# Patient Record
Sex: MALE | Race: WHITE
[De-identification: names, ages, dates, MRNs, and addresses within clinical notes are randomized per-mention and may not be internally consistent; named-entity substitution may affect disease eponyms.]

---

## 2017-04-21 ENCOUNTER — RX RENEWAL (OUTPATIENT)
Age: 82
End: 2017-04-21

## 2017-05-05 ENCOUNTER — APPOINTMENT (OUTPATIENT)
Dept: OPHTHALMOLOGY | Facility: CLINIC | Age: 82
End: 2017-05-05

## 2017-06-05 ENCOUNTER — INPATIENT (INPATIENT)
Facility: HOSPITAL | Age: 82
LOS: 2 days | Discharge: HOME | DRG: 309 | End: 2017-06-08
Attending: INTERNAL MEDICINE | Admitting: INTERNAL MEDICINE
Payer: MEDICARE

## 2017-06-05 VITALS
DIASTOLIC BLOOD PRESSURE: 84 MMHG | OXYGEN SATURATION: 97 % | TEMPERATURE: 99 F | SYSTOLIC BLOOD PRESSURE: 122 MMHG | HEART RATE: 71 BPM | RESPIRATION RATE: 18 BRPM

## 2017-06-05 DIAGNOSIS — R06.02 SHORTNESS OF BREATH: ICD-10-CM

## 2017-06-05 DIAGNOSIS — I48.2 CHRONIC ATRIAL FIBRILLATION: ICD-10-CM

## 2017-06-05 DIAGNOSIS — I10 ESSENTIAL (PRIMARY) HYPERTENSION: ICD-10-CM

## 2017-06-05 DIAGNOSIS — N17.9 ACUTE KIDNEY FAILURE, UNSPECIFIED: ICD-10-CM

## 2017-06-05 DIAGNOSIS — I42.1 OBSTRUCTIVE HYPERTROPHIC CARDIOMYOPATHY: ICD-10-CM

## 2017-06-05 DIAGNOSIS — Z29.9 ENCOUNTER FOR PROPHYLACTIC MEASURES, UNSPECIFIED: ICD-10-CM

## 2017-06-05 DIAGNOSIS — E78.00 PURE HYPERCHOLESTEROLEMIA, UNSPECIFIED: ICD-10-CM

## 2017-06-05 LAB
ALBUMIN SERPL ELPH-MCNC: 4.4 G/DL — SIGNIFICANT CHANGE UP (ref 3.3–5)
ALP SERPL-CCNC: 56 U/L — SIGNIFICANT CHANGE UP (ref 40–120)
ALT FLD-CCNC: 20 U/L RC — SIGNIFICANT CHANGE UP (ref 10–45)
ANION GAP SERPL CALC-SCNC: 16 MMOL/L — SIGNIFICANT CHANGE UP (ref 5–17)
APTT BLD: 34 SEC — SIGNIFICANT CHANGE UP (ref 27.5–37.4)
AST SERPL-CCNC: 21 U/L — SIGNIFICANT CHANGE UP (ref 10–40)
BASE EXCESS BLDV CALC-SCNC: 5.3 MMOL/L — HIGH (ref -2–2)
BILIRUB SERPL-MCNC: 0.5 MG/DL — SIGNIFICANT CHANGE UP (ref 0.2–1.2)
BUN SERPL-MCNC: 65 MG/DL — HIGH (ref 7–23)
CA-I SERPL-SCNC: 1.21 MMOL/L — SIGNIFICANT CHANGE UP (ref 1.12–1.3)
CALCIUM SERPL-MCNC: 9.9 MG/DL — SIGNIFICANT CHANGE UP (ref 8.4–10.5)
CHLORIDE BLDV-SCNC: 103 MMOL/L — SIGNIFICANT CHANGE UP (ref 96–108)
CHLORIDE SERPL-SCNC: 101 MMOL/L — SIGNIFICANT CHANGE UP (ref 96–108)
CK MB CFR SERPL CALC: 2.8 NG/ML — SIGNIFICANT CHANGE UP (ref 0–6.7)
CK SERPL-CCNC: 113 U/L — SIGNIFICANT CHANGE UP (ref 30–200)
CO2 BLDV-SCNC: 34 MMOL/L — HIGH (ref 22–30)
CO2 SERPL-SCNC: 27 MMOL/L — SIGNIFICANT CHANGE UP (ref 22–31)
CREAT SERPL-MCNC: 3.6 MG/DL — HIGH (ref 0.5–1.3)
GAS PNL BLDV: 141 MMOL/L — SIGNIFICANT CHANGE UP (ref 136–145)
GAS PNL BLDV: SIGNIFICANT CHANGE UP
GAS PNL BLDV: SIGNIFICANT CHANGE UP
GLUCOSE BLDV-MCNC: 91 MG/DL — SIGNIFICANT CHANGE UP (ref 70–99)
GLUCOSE SERPL-MCNC: 92 MG/DL — SIGNIFICANT CHANGE UP (ref 70–99)
HCO3 BLDV-SCNC: 32 MMOL/L — HIGH (ref 21–29)
HCT VFR BLD CALC: 45.9 % — SIGNIFICANT CHANGE UP (ref 39–50)
HCT VFR BLDA CALC: 46 % — SIGNIFICANT CHANGE UP (ref 39–50)
HGB BLD CALC-MCNC: 14.9 G/DL — SIGNIFICANT CHANGE UP (ref 13–17)
HGB BLD-MCNC: 14.5 G/DL — SIGNIFICANT CHANGE UP (ref 13–17)
INR BLD: 1.51 RATIO — HIGH (ref 0.88–1.16)
LACTATE BLDV-MCNC: 2 MMOL/L — SIGNIFICANT CHANGE UP (ref 0.7–2)
MCHC RBC-ENTMCNC: 29.2 PG — SIGNIFICANT CHANGE UP (ref 27–34)
MCHC RBC-ENTMCNC: 31.6 GM/DL — LOW (ref 32–36)
MCV RBC AUTO: 92.2 FL — SIGNIFICANT CHANGE UP (ref 80–100)
NT-PROBNP SERPL-SCNC: 3681 PG/ML — HIGH (ref 0–300)
OTHER CELLS CSF MANUAL: 9 ML/DL — LOW (ref 18–22)
PCO2 BLDV: 57 MMHG — HIGH (ref 35–50)
PH BLDV: 7.36 — SIGNIFICANT CHANGE UP (ref 7.35–7.45)
PLATELET # BLD AUTO: 174 K/UL — SIGNIFICANT CHANGE UP (ref 150–400)
PO2 BLDV: 27 MMHG — SIGNIFICANT CHANGE UP (ref 25–45)
POTASSIUM BLDV-SCNC: 5 MMOL/L — SIGNIFICANT CHANGE UP (ref 3.5–5)
POTASSIUM SERPL-MCNC: 4.2 MMOL/L — SIGNIFICANT CHANGE UP (ref 3.5–5.3)
POTASSIUM SERPL-SCNC: 4.2 MMOL/L — SIGNIFICANT CHANGE UP (ref 3.5–5.3)
PROT SERPL-MCNC: 7.7 G/DL — SIGNIFICANT CHANGE UP (ref 6–8.3)
PROTHROM AB SERPL-ACNC: 16.5 SEC — HIGH (ref 9.8–12.7)
RBC # BLD: 4.98 M/UL — SIGNIFICANT CHANGE UP (ref 4.2–5.8)
RBC # FLD: 13.5 % — SIGNIFICANT CHANGE UP (ref 10.3–14.5)
SAO2 % BLDV: 44 % — LOW (ref 67–88)
SODIUM SERPL-SCNC: 144 MMOL/L — SIGNIFICANT CHANGE UP (ref 135–145)
TROPONIN T SERPL-MCNC: <0.01 NG/ML — SIGNIFICANT CHANGE UP (ref 0–0.06)
WBC # BLD: 10 K/UL — SIGNIFICANT CHANGE UP (ref 3.8–10.5)
WBC # FLD AUTO: 10 K/UL — SIGNIFICANT CHANGE UP (ref 3.8–10.5)

## 2017-06-05 PROCEDURE — 99223 1ST HOSP IP/OBS HIGH 75: CPT

## 2017-06-05 PROCEDURE — 99285 EMERGENCY DEPT VISIT HI MDM: CPT | Mod: 25

## 2017-06-05 PROCEDURE — 71010: CPT | Mod: 26

## 2017-06-05 PROCEDURE — 93010 ELECTROCARDIOGRAM REPORT: CPT

## 2017-06-05 RX ORDER — ASPIRIN/CALCIUM CARB/MAGNESIUM 324 MG
81 TABLET ORAL DAILY
Qty: 0 | Refills: 0 | Status: DISCONTINUED | OUTPATIENT
Start: 2017-06-05 | End: 2017-06-08

## 2017-06-05 RX ORDER — FLUTICASONE PROPIONATE 50 MCG
2 SPRAY, SUSPENSION NASAL DAILY
Qty: 0 | Refills: 0 | Status: DISCONTINUED | OUTPATIENT
Start: 2017-06-05 | End: 2017-06-08

## 2017-06-05 RX ORDER — APIXABAN 2.5 MG/1
5 TABLET, FILM COATED ORAL
Qty: 0 | Refills: 0 | Status: DISCONTINUED | OUTPATIENT
Start: 2017-06-05 | End: 2017-06-06

## 2017-06-05 RX ORDER — FUROSEMIDE 40 MG
40 TABLET ORAL ONCE
Qty: 0 | Refills: 0 | Status: COMPLETED | OUTPATIENT
Start: 2017-06-05 | End: 2017-06-05

## 2017-06-05 RX ORDER — MONTELUKAST 4 MG/1
10 TABLET, CHEWABLE ORAL DAILY
Qty: 0 | Refills: 0 | Status: DISCONTINUED | OUTPATIENT
Start: 2017-06-05 | End: 2017-06-08

## 2017-06-05 RX ORDER — DILTIAZEM HCL 120 MG
180 CAPSULE, EXT RELEASE 24 HR ORAL DAILY
Qty: 0 | Refills: 0 | Status: DISCONTINUED | OUTPATIENT
Start: 2017-06-05 | End: 2017-06-08

## 2017-06-05 RX ORDER — ATORVASTATIN CALCIUM 80 MG/1
10 TABLET, FILM COATED ORAL AT BEDTIME
Qty: 0 | Refills: 0 | Status: DISCONTINUED | OUTPATIENT
Start: 2017-06-05 | End: 2017-06-08

## 2017-06-05 RX ADMIN — Medication 2 SPRAY(S): at 22:50

## 2017-06-05 RX ADMIN — Medication 40 MILLIGRAM(S): at 17:30

## 2017-06-05 RX ADMIN — APIXABAN 5 MILLIGRAM(S): 2.5 TABLET, FILM COATED ORAL at 22:50

## 2017-06-05 RX ADMIN — Medication 180 MILLIGRAM(S): at 22:31

## 2017-06-05 RX ADMIN — MONTELUKAST 10 MILLIGRAM(S): 4 TABLET, CHEWABLE ORAL at 22:31

## 2017-06-05 RX ADMIN — Medication 81 MILLIGRAM(S): at 22:31

## 2017-06-05 RX ADMIN — ATORVASTATIN CALCIUM 10 MILLIGRAM(S): 80 TABLET, FILM COATED ORAL at 22:31

## 2017-06-05 NOTE — ED CLERICAL - NS ED CLERK NOTE PRE-ARRIVAL INFORMATION; ADDITIONAL PRE-ARRIVAL INFORMATION
adrián, chf, not tolerating oral diuretics, med attending=dr. jaskaran bonilla, renal=dr. mary will, cards=Dr. Jay Lisker

## 2017-06-05 NOTE — ED ADULT NURSE NOTE - OBJECTIVE STATEMENT
80 y/o male came in sent from md. pt states he was sent in for elevated creatnine levels. pt states he has been on diuretics which then his dose got doubled and then tripled. pt denies any dysuria no hematuria. pt states he has chest pain and fluid in his lungs dyspnea on exertion. pt states the chest pain feels like discomfort. no n/v/d. no fevers no chills

## 2017-06-05 NOTE — H&P ADULT. - HISTORY OF PRESENT ILLNESS
81M w/ afib on xarelto, HOCM s/p ablation w/ AICD/PPM, CKD w/ crt 2.5, R iliac stent for aneurysm, HTN, HLD p/w chest pain and SOB. Pt has been following with his PMD and primary cardiologist for past 3 weeks with worsening SOB and substernal pressure with exertion. As per pt, he had TTE outpatient and was started on progressive doses of diuretics for his symptoms. Symptoms did not improve with lasix 20mg daily which was then increased to 40mg daily then to 40mg BID and then to bumex over this 3 week period. Pt did not notice significantly increased urination or change in weight. Baseline weight reportedly around 228lbs. Pt's creatinine has been trending upward on labwork as well to crt 3.3 3 days ago. Today when pt saw outpatient MD again, crt noted to be 3.7, given progressive worsening of symptoms pt advised to come to ER for possible IV diuretics. Pt denies any hx of cardiac stents but endorses LHC 14 yrs ago with possible 25% stenosis done at [place]. Last stress test around similar time period. No stairs at home. Pt endorses chest pressure and exertional SOB. Denies LE edema, cough, fever, chills, dysuria, changes in weight.    In ER: Given Lasix 40IV and evaluated by cardiology.

## 2017-06-05 NOTE — H&P ADULT. - PROBLEM SELECTOR PLAN 2
Symptoms possibly related to chronotropic incompetence.  -Will hold nebivolol for now  -Cont. dilt  -Telemetry for now  -Case discussed with cardiology, recommendations appreciated. Will cont. eliquis instead of xarelto given worsening kidney function. Will need to reassess insurance coverage before discharge

## 2017-06-05 NOTE — H&P ADULT. - PROBLEM SELECTOR PLAN 1
Given description of symptoms, lack of improvement with diuretics, minimal PE exam and imaging findings suggestive of CHF and equivocal BNP. Appreciate cardiology recommendations, suspect cardiac ischemia vs possible chronotropic incompetence. Will hold bystolic for now and diuretics as well especially given possible fluid see on outpatient echo.  -Strick I/Os  -monitor labs  -Stress test in AM  -F/u further cardiology recommendations  -Consider EP consult for interrogation  -Will order TTE  -Will start ASA and cont. statin

## 2017-06-05 NOTE — ED PROVIDER NOTE - OBJECTIVE STATEMENT
81 year old year old male with PMHx of HTN, HLD, Afib s/p ablation, on Xarelto, CHF with AICD/PPM, CKD baseline with creatinine 2.5, presenting for admission for IV diuresis. pt complaining of exertional chest pressure, SOB, CASAS, LE edema increasing in last few weeks. Has been increasing doses of Lasix and recently switched to Bumex with no increase in urinary output but creatinine increased. pt denies N/V or abdominal pain. No PE/DVT hx or risk factors. No URI symptoms. No fevers, sweats or chills.  No GI bleeding.

## 2017-06-05 NOTE — H&P ADULT. - ASSESSMENT
81M w/ afib on xarelto, HOCM s/p ablation w/ AICD/PPM, CKD w/ crt 2.5, R iliac stent for aneurysm, HTN, HLD p/w chest pain and SOB

## 2017-06-05 NOTE — ED PROVIDER NOTE - MUSCULOSKELETAL, MLM
Spine appears normal, range of motion is not limited, no muscle or joint tenderness. +Mild nonpitting b/l LE edema

## 2017-06-05 NOTE — ED PROVIDER NOTE - NS ED MD SCRIBE ATTENDING SCRIBE SECTIONS
PAST MEDICAL/SURGICAL/SOCIAL HISTORY/REVIEW OF SYSTEMS/PHYSICAL EXAM/VITAL SIGNS( Pullset)/HISTORY OF PRESENT ILLNESS/HIV/INTAKE ASSESSMENT/SCREENINGS

## 2017-06-05 NOTE — ED ADULT NURSE REASSESSMENT NOTE - NS ED NURSE REASSESS COMMENT FT1
1900-  report taken from [place], Day. Pt. A&Ox3. Pt.'s VSS 1900-  report taken from RNDay. Pt. A&Ox3. Pt.'s VSS. Lung sounds clear bilaterally

## 2017-06-05 NOTE — ED ADULT NURSE NOTE - PSH
AICD (automatic cardioverter/defibrillator) present  2009  Aneurysm artery, iliac  right iliac 2011  Hernia  right inguinal hernia repair 2000  S/P ablation of ventricular arrhythmia  septal ablation 2003

## 2017-06-05 NOTE — H&P ADULT. - PROBLEM SELECTOR PLAN 4
Pt states he had ablation of septal wall in the past. Had ICD/PPM placed after 2 episodes of syncope in the past.  -Holding nebivolol for now  -See above  -Repeat TTE as per cardiology

## 2017-06-06 LAB
ANION GAP SERPL CALC-SCNC: 21 MMOL/L — HIGH (ref 5–17)
BASOPHILS # BLD AUTO: 0.02 K/UL — SIGNIFICANT CHANGE UP (ref 0–0.2)
BASOPHILS NFR BLD AUTO: 0.2 % — SIGNIFICANT CHANGE UP (ref 0–2)
BUN SERPL-MCNC: 63 MG/DL — HIGH (ref 7–23)
CALCIUM SERPL-MCNC: 10.1 MG/DL — SIGNIFICANT CHANGE UP (ref 8.4–10.5)
CHLORIDE SERPL-SCNC: 103 MMOL/L — SIGNIFICANT CHANGE UP (ref 96–108)
CK MB BLD-MCNC: 2.9 % — SIGNIFICANT CHANGE UP (ref 0–3.5)
CK MB CFR SERPL CALC: 2.2 NG/ML — SIGNIFICANT CHANGE UP (ref 0–6.7)
CK SERPL-CCNC: 77 U/L — SIGNIFICANT CHANGE UP (ref 30–200)
CO2 SERPL-SCNC: 23 MMOL/L — SIGNIFICANT CHANGE UP (ref 22–31)
CREAT SERPL-MCNC: 3.7 MG/DL — HIGH (ref 0.5–1.3)
EOSINOPHIL # BLD AUTO: 0.12 K/UL — SIGNIFICANT CHANGE UP (ref 0–0.5)
EOSINOPHIL NFR BLD AUTO: 1.3 % — SIGNIFICANT CHANGE UP (ref 0–6)
GLUCOSE SERPL-MCNC: 118 MG/DL — HIGH (ref 70–99)
HCT VFR BLD CALC: 42.7 % — SIGNIFICANT CHANGE UP (ref 39–50)
HGB BLD-MCNC: 13.6 G/DL — SIGNIFICANT CHANGE UP (ref 13–17)
IMM GRANULOCYTES NFR BLD AUTO: 0.2 % — SIGNIFICANT CHANGE UP (ref 0–1.5)
LYMPHOCYTES # BLD AUTO: 1.69 K/UL — SIGNIFICANT CHANGE UP (ref 1–3.3)
LYMPHOCYTES # BLD AUTO: 18.9 % — SIGNIFICANT CHANGE UP (ref 13–44)
MAGNESIUM SERPL-MCNC: 2.4 MG/DL — SIGNIFICANT CHANGE UP (ref 1.6–2.6)
MCHC RBC-ENTMCNC: 28.3 PG — SIGNIFICANT CHANGE UP (ref 27–34)
MCHC RBC-ENTMCNC: 31.9 GM/DL — LOW (ref 32–36)
MCV RBC AUTO: 88.8 FL — SIGNIFICANT CHANGE UP (ref 80–100)
MONOCYTES # BLD AUTO: 1.11 K/UL — HIGH (ref 0–0.9)
MONOCYTES NFR BLD AUTO: 12.4 % — SIGNIFICANT CHANGE UP (ref 2–14)
NEUTROPHILS # BLD AUTO: 5.96 K/UL — SIGNIFICANT CHANGE UP (ref 1.8–7.4)
NEUTROPHILS NFR BLD AUTO: 67 % — SIGNIFICANT CHANGE UP (ref 43–77)
PLATELET # BLD AUTO: 195 K/UL — SIGNIFICANT CHANGE UP (ref 150–400)
POTASSIUM SERPL-MCNC: 4.3 MMOL/L — SIGNIFICANT CHANGE UP (ref 3.5–5.3)
POTASSIUM SERPL-SCNC: 4.3 MMOL/L — SIGNIFICANT CHANGE UP (ref 3.5–5.3)
RBC # BLD: 4.81 M/UL — SIGNIFICANT CHANGE UP (ref 4.2–5.8)
RBC # FLD: 14.4 % — SIGNIFICANT CHANGE UP (ref 10.3–14.5)
SODIUM SERPL-SCNC: 147 MMOL/L — HIGH (ref 135–145)
TROPONIN T SERPL-MCNC: <0.01 NG/ML — SIGNIFICANT CHANGE UP (ref 0–0.06)
WBC # BLD: 8.92 K/UL — SIGNIFICANT CHANGE UP (ref 3.8–10.5)
WBC # FLD AUTO: 8.92 K/UL — SIGNIFICANT CHANGE UP (ref 3.8–10.5)

## 2017-06-06 PROCEDURE — 99223 1ST HOSP IP/OBS HIGH 75: CPT | Mod: GC

## 2017-06-06 PROCEDURE — 93306 TTE W/DOPPLER COMPLETE: CPT | Mod: 26

## 2017-06-06 PROCEDURE — 93010 ELECTROCARDIOGRAM REPORT: CPT

## 2017-06-06 PROCEDURE — 76775 US EXAM ABDO BACK WALL LIM: CPT | Mod: 26

## 2017-06-06 PROCEDURE — 76770 US EXAM ABDO BACK WALL COMP: CPT | Mod: 26

## 2017-06-06 PROCEDURE — 99232 SBSQ HOSP IP/OBS MODERATE 35: CPT

## 2017-06-06 RX ORDER — PETROLATUM,WHITE
1 JELLY (GRAM) TOPICAL
Qty: 0 | Refills: 0 | Status: DISCONTINUED | OUTPATIENT
Start: 2017-06-06 | End: 2017-06-08

## 2017-06-06 RX ORDER — DIPHENHYDRAMINE HCL 50 MG
25 CAPSULE ORAL ONCE
Qty: 0 | Refills: 0 | Status: COMPLETED | OUTPATIENT
Start: 2017-06-06 | End: 2017-06-06

## 2017-06-06 RX ORDER — SENNA PLUS 8.6 MG/1
2 TABLET ORAL AT BEDTIME
Qty: 0 | Refills: 0 | Status: DISCONTINUED | OUTPATIENT
Start: 2017-06-06 | End: 2017-06-08

## 2017-06-06 RX ORDER — APIXABAN 2.5 MG/1
2.5 TABLET, FILM COATED ORAL
Qty: 0 | Refills: 0 | Status: DISCONTINUED | OUTPATIENT
Start: 2017-06-06 | End: 2017-06-08

## 2017-06-06 RX ORDER — DOCUSATE SODIUM 100 MG
100 CAPSULE ORAL THREE TIMES A DAY
Qty: 0 | Refills: 0 | Status: DISCONTINUED | OUTPATIENT
Start: 2017-06-06 | End: 2017-06-08

## 2017-06-06 RX ADMIN — APIXABAN 5 MILLIGRAM(S): 2.5 TABLET, FILM COATED ORAL at 05:07

## 2017-06-06 RX ADMIN — Medication 1 APPLICATION(S): at 18:16

## 2017-06-06 RX ADMIN — SENNA PLUS 2 TABLET(S): 8.6 TABLET ORAL at 21:35

## 2017-06-06 RX ADMIN — Medication 2 SPRAY(S): at 13:10

## 2017-06-06 RX ADMIN — Medication 180 MILLIGRAM(S): at 21:35

## 2017-06-06 RX ADMIN — MONTELUKAST 10 MILLIGRAM(S): 4 TABLET, CHEWABLE ORAL at 13:10

## 2017-06-06 RX ADMIN — Medication 81 MILLIGRAM(S): at 13:10

## 2017-06-06 RX ADMIN — Medication 25 MILLIGRAM(S): at 22:37

## 2017-06-06 RX ADMIN — ATORVASTATIN CALCIUM 10 MILLIGRAM(S): 80 TABLET, FILM COATED ORAL at 21:35

## 2017-06-06 RX ADMIN — Medication 100 MILLIGRAM(S): at 21:35

## 2017-06-06 RX ADMIN — Medication 100 MILLIGRAM(S): at 13:10

## 2017-06-06 RX ADMIN — APIXABAN 2.5 MILLIGRAM(S): 2.5 TABLET, FILM COATED ORAL at 18:16

## 2017-06-07 DIAGNOSIS — N18.3 CHRONIC KIDNEY DISEASE, STAGE 3 (MODERATE): ICD-10-CM

## 2017-06-07 DIAGNOSIS — R06.09 OTHER FORMS OF DYSPNEA: ICD-10-CM

## 2017-06-07 DIAGNOSIS — I10 ESSENTIAL (PRIMARY) HYPERTENSION: ICD-10-CM

## 2017-06-07 DIAGNOSIS — I48.0 PAROXYSMAL ATRIAL FIBRILLATION: ICD-10-CM

## 2017-06-07 LAB
24R-OH-CALCIDIOL SERPL-MCNC: 64.4 NG/ML — SIGNIFICANT CHANGE UP (ref 30–100)
ANION GAP SERPL CALC-SCNC: 19 MMOL/L — HIGH (ref 5–17)
BUN SERPL-MCNC: 55 MG/DL — HIGH (ref 7–23)
CALCIUM SERPL-MCNC: 9.6 MG/DL — SIGNIFICANT CHANGE UP (ref 8.4–10.5)
CHLORIDE SERPL-SCNC: 103 MMOL/L — SIGNIFICANT CHANGE UP (ref 96–108)
CHOLEST SERPL-MCNC: 122 MG/DL — SIGNIFICANT CHANGE UP (ref 10–199)
CO2 SERPL-SCNC: 22 MMOL/L — SIGNIFICANT CHANGE UP (ref 22–31)
CREAT SERPL-MCNC: 3.21 MG/DL — HIGH (ref 0.5–1.3)
GLUCOSE SERPL-MCNC: 96 MG/DL — SIGNIFICANT CHANGE UP (ref 70–99)
HCT VFR BLD CALC: 41.7 % — SIGNIFICANT CHANGE UP (ref 39–50)
HDLC SERPL-MCNC: 37 MG/DL — LOW (ref 40–125)
HGB BLD-MCNC: 13.4 G/DL — SIGNIFICANT CHANGE UP (ref 13–17)
LACTATE SERPL-SCNC: 1.1 MMOL/L — SIGNIFICANT CHANGE UP (ref 0.7–2)
LIPID PNL WITH DIRECT LDL SERPL: 72 MG/DL — SIGNIFICANT CHANGE UP
MCHC RBC-ENTMCNC: 28.6 PG — SIGNIFICANT CHANGE UP (ref 27–34)
MCHC RBC-ENTMCNC: 32.1 GM/DL — SIGNIFICANT CHANGE UP (ref 32–36)
MCV RBC AUTO: 88.9 FL — SIGNIFICANT CHANGE UP (ref 80–100)
PHOSPHATE SERPL-MCNC: 3.3 MG/DL — SIGNIFICANT CHANGE UP (ref 2.5–4.5)
PLATELET # BLD AUTO: 167 K/UL — SIGNIFICANT CHANGE UP (ref 150–400)
POTASSIUM SERPL-MCNC: 4.1 MMOL/L — SIGNIFICANT CHANGE UP (ref 3.5–5.3)
POTASSIUM SERPL-SCNC: 4.1 MMOL/L — SIGNIFICANT CHANGE UP (ref 3.5–5.3)
RBC # BLD: 4.69 M/UL — SIGNIFICANT CHANGE UP (ref 4.2–5.8)
RBC # FLD: 14.2 % — SIGNIFICANT CHANGE UP (ref 10.3–14.5)
SODIUM SERPL-SCNC: 144 MMOL/L — SIGNIFICANT CHANGE UP (ref 135–145)
TOTAL CHOLESTEROL/HDL RATIO MEASUREMENT: 3.3 RATIO — LOW (ref 3.4–9.6)
TRIGL SERPL-MCNC: 67 MG/DL — SIGNIFICANT CHANGE UP (ref 10–149)
WBC # BLD: 6.86 K/UL — SIGNIFICANT CHANGE UP (ref 3.8–10.5)
WBC # FLD AUTO: 6.86 K/UL — SIGNIFICANT CHANGE UP (ref 3.8–10.5)

## 2017-06-07 PROCEDURE — 99232 SBSQ HOSP IP/OBS MODERATE 35: CPT | Mod: GC

## 2017-06-07 PROCEDURE — 93016 CV STRESS TEST SUPVJ ONLY: CPT

## 2017-06-07 PROCEDURE — 78452 HT MUSCLE IMAGE SPECT MULT: CPT | Mod: 26

## 2017-06-07 PROCEDURE — 93018 CV STRESS TEST I&R ONLY: CPT

## 2017-06-07 PROCEDURE — 99233 SBSQ HOSP IP/OBS HIGH 50: CPT

## 2017-06-07 RX ORDER — AMIODARONE HYDROCHLORIDE 400 MG/1
200 TABLET ORAL DAILY
Qty: 0 | Refills: 0 | Status: CANCELLED | OUTPATIENT
Start: 2017-06-11 | End: 2017-06-08

## 2017-06-07 RX ORDER — AMIODARONE HYDROCHLORIDE 400 MG/1
400 TABLET ORAL EVERY 8 HOURS
Qty: 0 | Refills: 0 | Status: DISCONTINUED | OUTPATIENT
Start: 2017-06-07 | End: 2017-06-08

## 2017-06-07 RX ORDER — CHOLECALCIFEROL (VITAMIN D3) 125 MCG
1000 CAPSULE ORAL DAILY
Qty: 0 | Refills: 0 | Status: DISCONTINUED | OUTPATIENT
Start: 2017-06-07 | End: 2017-06-08

## 2017-06-07 RX ADMIN — Medication 2 SPRAY(S): at 11:45

## 2017-06-07 RX ADMIN — AMIODARONE HYDROCHLORIDE 400 MILLIGRAM(S): 400 TABLET ORAL at 18:19

## 2017-06-07 RX ADMIN — Medication 100 MILLIGRAM(S): at 21:15

## 2017-06-07 RX ADMIN — Medication 180 MILLIGRAM(S): at 21:16

## 2017-06-07 RX ADMIN — Medication 1 APPLICATION(S): at 17:21

## 2017-06-07 RX ADMIN — Medication 1 APPLICATION(S): at 05:18

## 2017-06-07 RX ADMIN — Medication 100 MILLIGRAM(S): at 13:54

## 2017-06-07 RX ADMIN — ATORVASTATIN CALCIUM 10 MILLIGRAM(S): 80 TABLET, FILM COATED ORAL at 21:15

## 2017-06-07 RX ADMIN — APIXABAN 2.5 MILLIGRAM(S): 2.5 TABLET, FILM COATED ORAL at 06:24

## 2017-06-07 RX ADMIN — MONTELUKAST 10 MILLIGRAM(S): 4 TABLET, CHEWABLE ORAL at 11:45

## 2017-06-07 RX ADMIN — SENNA PLUS 2 TABLET(S): 8.6 TABLET ORAL at 21:15

## 2017-06-07 RX ADMIN — APIXABAN 2.5 MILLIGRAM(S): 2.5 TABLET, FILM COATED ORAL at 17:21

## 2017-06-07 RX ADMIN — Medication 81 MILLIGRAM(S): at 11:45

## 2017-06-07 RX ADMIN — Medication 100 MILLIGRAM(S): at 05:16

## 2017-06-07 NOTE — PROGRESS NOTE ADULT - PROBLEM SELECTOR PLAN 1
Pt. with baseline Scr of ~2.5-3.0 as per pt. ROSEANNE on CKD likely hemodynamically mediated in the setting of decreased cardiac output. Pt. Scr improved to 3.2. Pt. not sob/ no edema. Agree to hold off diuretics. Pt. non oliguric with good urine output. Monitor BMP, Urine output, I/Os. Avoid nephrotoxins, RCA, NSAIDS. Pt. with baseline Scr of ~2.5-3.0 as per pt. ROSEANNE on CKD likely overdiuresis. Pt. Scr improved to 3.2. Pt. not sob/ no edema. Agree to hold off diuretics. Pt. non oliguric with good urine output. Monitor BMP, Urine output, I/Os. Avoid nephrotoxins, RCA, NSAIDS.

## 2017-06-07 NOTE — PROGRESS NOTE ADULT - PROBLEM SELECTOR PLAN 1
His exertional symptoms do not appear to be related to either volume overload or ischemia. I suspect that his atrial fibrillation is the source of his discomfort.  I reviewed the possible anti-arrhythmic options with his electrophysiologist, Dr. Fraser ([phone]) who agrees with rhythm control approach and suggested amiodarone as therapy due to his underlying renal dysfunction. I reviewed the possible side effect of this therapy with the pt and his wife. Will proceed with Amio loading 400 tid with meals. Cardioversion after loading can be done as an out patient.  Continue oral A/C with Eliquis. will lower bystolic dose. d/c planning 6/8 at night.

## 2017-06-08 ENCOUNTER — TRANSCRIPTION ENCOUNTER (OUTPATIENT)
Age: 82
End: 2017-06-08

## 2017-06-08 VITALS
HEART RATE: 70 BPM | RESPIRATION RATE: 18 BRPM | SYSTOLIC BLOOD PRESSURE: 127 MMHG | TEMPERATURE: 97 F | DIASTOLIC BLOOD PRESSURE: 77 MMHG | OXYGEN SATURATION: 97 %

## 2017-06-08 DIAGNOSIS — E87.70 FLUID OVERLOAD, UNSPECIFIED: ICD-10-CM

## 2017-06-08 LAB
ANION GAP SERPL CALC-SCNC: 17 MMOL/L — SIGNIFICANT CHANGE UP (ref 5–17)
BUN SERPL-MCNC: 52 MG/DL — HIGH (ref 7–23)
CALCIUM SERPL-MCNC: 9.4 MG/DL — SIGNIFICANT CHANGE UP (ref 8.4–10.5)
CHLORIDE SERPL-SCNC: 99 MMOL/L — SIGNIFICANT CHANGE UP (ref 96–108)
CO2 SERPL-SCNC: 23 MMOL/L — SIGNIFICANT CHANGE UP (ref 22–31)
CREAT SERPL-MCNC: 3.06 MG/DL — HIGH (ref 0.5–1.3)
GLUCOSE SERPL-MCNC: 111 MG/DL — HIGH (ref 70–99)
HCT VFR BLD CALC: 42.1 % — SIGNIFICANT CHANGE UP (ref 39–50)
HGB BLD-MCNC: 13.6 G/DL — SIGNIFICANT CHANGE UP (ref 13–17)
MAGNESIUM SERPL-MCNC: 2.4 MG/DL — SIGNIFICANT CHANGE UP (ref 1.6–2.6)
MCHC RBC-ENTMCNC: 28.5 PG — SIGNIFICANT CHANGE UP (ref 27–34)
MCHC RBC-ENTMCNC: 32.3 GM/DL — SIGNIFICANT CHANGE UP (ref 32–36)
MCV RBC AUTO: 88.1 FL — SIGNIFICANT CHANGE UP (ref 80–100)
PHOSPHATE SERPL-MCNC: 3.5 MG/DL — SIGNIFICANT CHANGE UP (ref 2.5–4.5)
PLATELET # BLD AUTO: 190 K/UL — SIGNIFICANT CHANGE UP (ref 150–400)
POTASSIUM SERPL-MCNC: 4.4 MMOL/L — SIGNIFICANT CHANGE UP (ref 3.5–5.3)
POTASSIUM SERPL-SCNC: 4.4 MMOL/L — SIGNIFICANT CHANGE UP (ref 3.5–5.3)
RBC # BLD: 4.78 M/UL — SIGNIFICANT CHANGE UP (ref 4.2–5.8)
RBC # FLD: 14.1 % — SIGNIFICANT CHANGE UP (ref 10.3–14.5)
SODIUM SERPL-SCNC: 139 MMOL/L — SIGNIFICANT CHANGE UP (ref 135–145)
TSH SERPL-MCNC: 2.57 UIU/ML — SIGNIFICANT CHANGE UP (ref 0.27–4.2)
WBC # BLD: 8.29 K/UL — SIGNIFICANT CHANGE UP (ref 3.8–10.5)
WBC # FLD AUTO: 8.29 K/UL — SIGNIFICANT CHANGE UP (ref 3.8–10.5)

## 2017-06-08 PROCEDURE — 99285 EMERGENCY DEPT VISIT HI MDM: CPT | Mod: 25

## 2017-06-08 PROCEDURE — 80048 BASIC METABOLIC PNL TOTAL CA: CPT

## 2017-06-08 PROCEDURE — A9500: CPT

## 2017-06-08 PROCEDURE — 85027 COMPLETE CBC AUTOMATED: CPT

## 2017-06-08 PROCEDURE — 71045 X-RAY EXAM CHEST 1 VIEW: CPT

## 2017-06-08 PROCEDURE — 84443 ASSAY THYROID STIM HORMONE: CPT

## 2017-06-08 PROCEDURE — 85730 THROMBOPLASTIN TIME PARTIAL: CPT

## 2017-06-08 PROCEDURE — 82550 ASSAY OF CK (CPK): CPT

## 2017-06-08 PROCEDURE — 82803 BLOOD GASES ANY COMBINATION: CPT

## 2017-06-08 PROCEDURE — 83880 ASSAY OF NATRIURETIC PEPTIDE: CPT

## 2017-06-08 PROCEDURE — 82435 ASSAY OF BLOOD CHLORIDE: CPT

## 2017-06-08 PROCEDURE — 99233 SBSQ HOSP IP/OBS HIGH 50: CPT | Mod: GC

## 2017-06-08 PROCEDURE — 80061 LIPID PANEL: CPT

## 2017-06-08 PROCEDURE — 84132 ASSAY OF SERUM POTASSIUM: CPT

## 2017-06-08 PROCEDURE — 82553 CREATINE MB FRACTION: CPT

## 2017-06-08 PROCEDURE — 84100 ASSAY OF PHOSPHORUS: CPT

## 2017-06-08 PROCEDURE — 99233 SBSQ HOSP IP/OBS HIGH 50: CPT

## 2017-06-08 PROCEDURE — 93017 CV STRESS TEST TRACING ONLY: CPT

## 2017-06-08 PROCEDURE — 82306 VITAMIN D 25 HYDROXY: CPT

## 2017-06-08 PROCEDURE — 82947 ASSAY GLUCOSE BLOOD QUANT: CPT

## 2017-06-08 PROCEDURE — 94640 AIRWAY INHALATION TREATMENT: CPT

## 2017-06-08 PROCEDURE — 78452 HT MUSCLE IMAGE SPECT MULT: CPT

## 2017-06-08 PROCEDURE — 93005 ELECTROCARDIOGRAM TRACING: CPT

## 2017-06-08 PROCEDURE — 82330 ASSAY OF CALCIUM: CPT

## 2017-06-08 PROCEDURE — 80053 COMPREHEN METABOLIC PANEL: CPT

## 2017-06-08 PROCEDURE — 83605 ASSAY OF LACTIC ACID: CPT

## 2017-06-08 PROCEDURE — 76770 US EXAM ABDO BACK WALL COMP: CPT

## 2017-06-08 PROCEDURE — 84484 ASSAY OF TROPONIN QUANT: CPT

## 2017-06-08 PROCEDURE — 84295 ASSAY OF SERUM SODIUM: CPT

## 2017-06-08 PROCEDURE — 83735 ASSAY OF MAGNESIUM: CPT

## 2017-06-08 PROCEDURE — 93306 TTE W/DOPPLER COMPLETE: CPT

## 2017-06-08 PROCEDURE — 85610 PROTHROMBIN TIME: CPT

## 2017-06-08 PROCEDURE — 85014 HEMATOCRIT: CPT

## 2017-06-08 RX ORDER — BUMETANIDE 0.25 MG/ML
1 INJECTION INTRAMUSCULAR; INTRAVENOUS
Qty: 0 | Refills: 0 | COMMUNITY

## 2017-06-08 RX ORDER — FLUTICASONE PROPIONATE 50 MCG
2 SPRAY, SUSPENSION NASAL
Qty: 0 | Refills: 0 | COMMUNITY

## 2017-06-08 RX ORDER — DIPHENHYDRAMINE HCL 50 MG
25 CAPSULE ORAL ONCE
Qty: 0 | Refills: 0 | Status: COMPLETED | OUTPATIENT
Start: 2017-06-08 | End: 2017-06-08

## 2017-06-08 RX ORDER — APIXABAN 2.5 MG/1
2.5 TABLET, FILM COATED ORAL ONCE
Qty: 0 | Refills: 0 | Status: COMPLETED | OUTPATIENT
Start: 2017-06-08 | End: 2017-06-08

## 2017-06-08 RX ORDER — CHOLECALCIFEROL (VITAMIN D3) 125 MCG
1000 CAPSULE ORAL
Qty: 0 | Refills: 0 | COMMUNITY
Start: 2017-06-08

## 2017-06-08 RX ORDER — CHOLECALCIFEROL (VITAMIN D3) 125 MCG
1 CAPSULE ORAL
Qty: 0 | Refills: 0 | COMMUNITY

## 2017-06-08 RX ORDER — APIXABAN 2.5 MG/1
1 TABLET, FILM COATED ORAL
Qty: 60 | Refills: 0 | OUTPATIENT
Start: 2017-06-08 | End: 2017-07-08

## 2017-06-08 RX ORDER — FONDAPARINUX SODIUM 2.5 MG/.5ML
1 INJECTION, SOLUTION SUBCUTANEOUS
Qty: 0 | Refills: 0 | COMMUNITY

## 2017-06-08 RX ORDER — ASPIRIN/CALCIUM CARB/MAGNESIUM 324 MG
1 TABLET ORAL
Qty: 30 | Refills: 0 | OUTPATIENT
Start: 2017-06-08 | End: 2017-07-08

## 2017-06-08 RX ORDER — APIXABAN 2.5 MG/1
5 TABLET, FILM COATED ORAL
Qty: 0 | Refills: 0 | Status: DISCONTINUED | OUTPATIENT
Start: 2017-06-08 | End: 2017-06-08

## 2017-06-08 RX ORDER — PETROLATUM,WHITE
1 JELLY (GRAM) TOPICAL
Qty: 0 | Refills: 0 | COMMUNITY
Start: 2017-06-08

## 2017-06-08 RX ORDER — AMIODARONE HYDROCHLORIDE 400 MG/1
2 TABLET ORAL
Qty: 180 | Refills: 0 | OUTPATIENT
Start: 2017-06-08 | End: 2017-07-08

## 2017-06-08 RX ADMIN — Medication 100 MILLIGRAM(S): at 12:36

## 2017-06-08 RX ADMIN — AMIODARONE HYDROCHLORIDE 400 MILLIGRAM(S): 400 TABLET ORAL at 16:59

## 2017-06-08 RX ADMIN — AMIODARONE HYDROCHLORIDE 400 MILLIGRAM(S): 400 TABLET ORAL at 08:48

## 2017-06-08 RX ADMIN — Medication 81 MILLIGRAM(S): at 12:35

## 2017-06-08 RX ADMIN — Medication 25 MILLIGRAM(S): at 00:51

## 2017-06-08 RX ADMIN — Medication 1000 UNIT(S): at 12:35

## 2017-06-08 RX ADMIN — APIXABAN 2.5 MILLIGRAM(S): 2.5 TABLET, FILM COATED ORAL at 05:19

## 2017-06-08 RX ADMIN — APIXABAN 2.5 MILLIGRAM(S): 2.5 TABLET, FILM COATED ORAL at 12:34

## 2017-06-08 RX ADMIN — AMIODARONE HYDROCHLORIDE 400 MILLIGRAM(S): 400 TABLET ORAL at 00:08

## 2017-06-08 RX ADMIN — Medication 1 APPLICATION(S): at 05:20

## 2017-06-08 RX ADMIN — MONTELUKAST 10 MILLIGRAM(S): 4 TABLET, CHEWABLE ORAL at 12:35

## 2017-06-08 RX ADMIN — Medication 2 SPRAY(S): at 12:35

## 2017-06-08 RX ADMIN — Medication 100 MILLIGRAM(S): at 05:19

## 2017-06-08 NOTE — DISCHARGE NOTE ADULT - CARE PROVIDERS DIRECT ADDRESSES
[email or web address]

## 2017-06-08 NOTE — PROGRESS NOTE ADULT - PROBLEM SELECTOR PLAN 2
BP stable range. Monitor BP.
rate controlled with cardizem, ac with eliquis
hold diuretic improving cr.  symtoms should improve with return of SR.
rate controlled with cardizem, ac with eliquis
BP stable range. Monitor BP.
hold diuretic. improving cr.  Possible chronotropic incompetence. will reprogram pacer to allow for rate responsiveness.

## 2017-06-08 NOTE — PROGRESS NOTE ADULT - PROBLEM SELECTOR PLAN 3
cont cardizem
I would hold off on continuing diuretics for now.  recommend close out patient follow up.
Stable on current regimen.
cont cardizem
Stable on current regimen.

## 2017-06-08 NOTE — DISCHARGE NOTE ADULT - ADDITIONAL INSTRUCTIONS
follow up with primary care physician within one week after discharge  follow up with renal Dr Velma Roberts and cardiology Dr. Lisker in one week for diuretic decisions follow up with primary care physician tomorrow for Eliquis samples and pre authorization of this medication  follow up with renal Dr eVlma Roberts and cardiology Dr. Lisker in one week for diuretic decisions

## 2017-06-08 NOTE — DISCHARGE NOTE ADULT - MEDICATION SUMMARY - MEDICATIONS TO STOP TAKING
I will STOP taking the medications listed below when I get home from the hospital:    Bystolic 10 mg oral tablet  -- 1 tab(s) by mouth once a day    Vitamin D3 2000 intl units oral tablet  -- 1 tab(s) by mouth once a day    Xarelto 15 mg oral tablet  -- 1 tab(s) by mouth once a day (in the evening)    Bumex  -- 1 milligram(s)  once a day

## 2017-06-08 NOTE — DISCHARGE NOTE ADULT - PLAN OF CARE
return of creatinine to baseline Avoid taking (NSAIDs) - (ex: Ibuprofen, Advil, Celebrex, Naprosyn)  Avoid taking any nephrotoxic agents (can harm kidneys) - Intravenous contrast for diagnostic testing, combination cold medications.  Have all medications adjusted for your renal function by your Health Care Provider.  Blood pressure control is important.  Take all medication as prescribed.  follow up with renal and cardiology in one week for diuretic instructions - HOLD at this time  follow up with primary care physician within one week after discharge Atrial fibrillation is the most common heart rhythm problem & has the risk of stroke & heart attack  It helps if you control your blood pressure, not drink more than 1-2 alcohol drinks per day, cut down on caffeine, getting treatment for over active thyroid gland, & getting exercise  Call your doctor if you feel your heart racing or beating unusually, chest tightness or pain, lightheaded, faint, shortness of breath especially with exercise  It is important to take your heart medication as prescribed  You are on Eliquis for anticoagulation  Eliquis/Apixaban is used to thin the blood so clots will not form and to keep existing ones from getting bigger.  Take this medication daily as prescribed by your health care provider.  Take this medication with food to prevent upset stomach.  If you miss a dose call your health care provider or pharmacist right away.  Tell your doctor you use this drug before you have a spinal or epidural procedure  Tell dentists, surgeon, and other doctors that you use this drug.  You may bleed more easily.  Be careful and avoid injury.  Use a soft toothbrush and an electric razor. your pacemaker settings were changed in your AICD to correct the problem

## 2017-06-08 NOTE — PROGRESS NOTE ADULT - ASSESSMENT
82 y/o M PMH CAD HTN AFIB CKD p/w sob worsening renal function
82 y/o M PMH CAD HTN AFIB CKD p/w sob worsening renal function
He is  81 year old with hx of alcohol septal ablation for HOCM, atrial fibrillation, non-obstructive CAD, chronic renal insufficiency now presenting with exertional dyspnea.
He is  81 year old with hx of HOCM and

## 2017-06-08 NOTE — PROGRESS NOTE ADULT - PROBLEM SELECTOR PLAN 1
Pt. with baseline Scr of ~2.5-3.0 as per pt. ROSEANEN on CKD likely overdiuresis. Pt. Scr improved to 3. Pt. not sob/ no edema. Agree to hold off diuretics. Pt. non oliguric with good urine output. Monitor BMP, Urine output, I/Os. Avoid nephrotoxins, RCA, NSAIDS.

## 2017-06-08 NOTE — PROGRESS NOTE ADULT - SUBJECTIVE AND OBJECTIVE BOX
HPI: He reports feeling well. Some concern about interactions of Amio with other meds.    The medical history is unchanged.  Review Of Systems:  The remainder of his ROS is unchanged.    Medications:  aspirin enteric coated 81milliGRAM(s) Oral daily  montelukast 10milliGRAM(s) Oral daily  fluticasone propionate 50 MICROgram(s)/spray Nasal Spray 2Spray(s) Both Nostrils daily  diltiazem   CD 180milliGRAM(s) Oral daily  atorvastatin 10milliGRAM(s) Oral at bedtime  AQUAPHOR (petrolatum Ointment) 1Application(s) Topical two times a day  docusate sodium 100milliGRAM(s) Oral three times a day  senna 2Tablet(s) Oral at bedtime  amiodarone    Tablet 400milliGRAM(s) Oral every 8 hours  cholecalciferol 1000Unit(s) Oral daily  apixaban 5milliGRAM(s) Oral two times a day      Allergies    No Known Allergies    Vitals:  T(C): 36.9, Max: 36.9 ( @ 04:23)  HR: 75 (70 - 77)  BP: 108/70 (108/70 - 127/82)  BP(mean): --  RR: 18 (16 - 18)  SpO2: 97% (96% - 97%)  Wt(kg): --  Daily     Daily Weight in k.9 (2017 07:59)  I&O's Summary  I & Os for 24h ending 2017 07:00  =============================================  IN: 1100 ml / OUT: 1650 ml / NET: -550 ml    I & Os for current day (as of 2017 09:16)  =============================================  IN: 360 ml / OUT: 0 ml / NET: 360 ml      Physical Exam:  Appearance: NAD  HEENT: No icterus or JVD  Cardiovascular: irRegular rhythm, normal S1, S2, No murmurs or rubs  Respiratory: clear to ascultation bilaterally, no crackles or wheeze.  Gastrointestinal: +BS, soft  Musculoskeletal: No clubbing  Neurologic: Non-focal, alert and oriented, Mood & affect appropriate  Lymphatic: No lymphadenopathy  Skin: Warm and moist, no cyanosis                          13.6   8.29  )-----------( 190      ( 2017 07:29 )             42.1     06-08    139  |  99  |  52<H>  ----------------------------<  111<H>  4.4   |  23  |  3.06<H>    Ca    9.4      2017 07:41  Phos  3.5     06-08  Mg     2.4     06-08      CARDIAC MARKERS ( 2017 16:43 )  x     / <0.01 ng/mL / 77 U/L / x     / 2.2 ng/mL    Serum Pro-Brain Natriuretic Peptide: 3681 pg/mL ( @ 15:22)    Interpretation of Telemetry: A fib 60's  ECG:  Echo:  Stress Testing:   Cath:  Imaging:
[facility] DIVISION OF KIDNEY DISEASES AND HYPERTENSION -- FOLLOW UP NOTE  --------------------------------------------------------------------------------  Chief Complaint: ROSEANNE/CKD    24 hour events/subjective:    No acute events overnight. Pt. to get NST today.     PAST HISTORY  --------------------------------------------------------------------------------  No significant changes to PMH, PSH, FHx, SHx, unless otherwise noted    ALLERGIES & MEDICATIONS  --------------------------------------------------------------------------------  Allergies    No Known Allergies    Intolerances      Standing Inpatient Medications  aspirin enteric coated 81milliGRAM(s) Oral daily  montelukast 10milliGRAM(s) Oral daily  fluticasone propionate 50 MICROgram(s)/spray Nasal Spray 2Spray(s) Both Nostrils daily  diltiazem   CD 180milliGRAM(s) Oral daily  atorvastatin 10milliGRAM(s) Oral at bedtime  AQUAPHOR (petrolatum Ointment) 1Application(s) Topical two times a day  docusate sodium 100milliGRAM(s) Oral three times a day  senna 2Tablet(s) Oral at bedtime  apixaban 2.5milliGRAM(s) Oral two times a day    PRN Inpatient Medications      REVIEW OF SYSTEMS  --------------------------------------------------------------------------------  Pt. denies CP, SOB, N/V.     All other systems were reviewed and are negative, except as noted.    VITALS/PHYSICAL EXAM  --------------------------------------------------------------------------------  T(C): 36.4, Max: 36.5 (06-06 @ 21:25)  HR: 68 (68 - 71)  BP: 112/74 (112/74 - 117/76)  RR: 18 (18 - 18)  SpO2: 97% (96% - 97%)  Wt(kg): --  Height (cm): 177.8 (06-05-17 @ 22:09)  Weight (kg): 99.7 (06-05-17 @ 22:09)  BMI (kg/m2): 31.5 (06-05-17 @ 22:09)  BSA (m2): 2.17 (06-05-17 @ 22:09)    I & Os for 24h ending 06-07-17 @ 07:00  =============================================  IN: 1060 ml / OUT: 1050 ml / NET: 10 ml    I & Os for current day (as of 06-07-17 @ 13:55)  =============================================  IN: 360 ml / OUT: 400 ml / NET: -40 ml    Physical Exam:  	Gen: NAD, well-appearing  	HEENT: PERRL,   	Pulm: CTA B/L  	CV: RRR, S1S2;  	Abd: +BS, soft,  	LE: Warm, no edema  	Skin: Warm, without rashes      LABS/STUDIES  --------------------------------------------------------------------------------              13.4   6.86  >-----------<  167      [06-07-17 @ 07:30]              41.7     144  |  103  |  55  ----------------------------<  96      [06-07-17 @ 07:24]  4.1   |  22  |  3.21        Ca     9.6     [06-07-17 @ 07:24]      Mg     2.4     [06-06-17 @ 07:33]      Phos  3.3     [06-07-17 @ 07:24]    TPro  7.7  /  Alb  4.4  /  TBili  0.5  /  DBili  x   /  AST  21  /  ALT  20  /  AlkPhos  56  [06-05-17 @ 15:22]    PT/INR: PT 16.5 , INR 1.51       [06-05-17 @ 15:22]  PTT: 34.0       [06-05-17 @ 15:22]    Troponin <0.01      [06-06-17 @ 16:43]  CK 77      [06-06-17 @ 16:43]    Creatinine Trend:  SCr 3.21 [06-07 @ 07:24]  SCr 3.70 [06-06 @ 07:33]  SCr 3.60 [06-05 @ 15:22]        Vitamin D (25OH) 64.4      [06-07-17 @ 07:20]  Lipid: chol 122, TG 67, HDL 37, LDL 72      [06-07-17 @ 07:24]
cc : chest pain/ sob    SUBJECTIVE / OVERNIGHT EVENTS: pt denies chest pain, shortness of breath, nausea,v       MEDICATIONS  (STANDING):  aspirin enteric coated 81milliGRAM(s) Oral daily  montelukast 10milliGRAM(s) Oral daily  fluticasone propionate 50 MICROgram(s)/spray Nasal Spray 2Spray(s) Both Nostrils daily  diltiazem   CD 180milliGRAM(s) Oral daily  atorvastatin 10milliGRAM(s) Oral at bedtime  AQUAPHOR (petrolatum Ointment) 1Application(s) Topical two times a day  docusate sodium 100milliGRAM(s) Oral three times a day  senna 2Tablet(s) Oral at bedtime  apixaban 2.5milliGRAM(s) Oral two times a day    MEDICATIONS  (PRN):      Vital Signs Last 24 Hrs  T(C): 36.7, Max: 36.7 (06-07 @ 14:16)  HR: 75 (68 - 75)  BP: 127/82 (112/74 - 127/82)  RR: 16 (16 - 18)  SpO2: 96% (96% - 97%)  Wt(kg): --  CAPILLARY BLOOD GLUCOSE    I&O's Summary  I & Os for 24h ending 07 Jun 2017 07:00  =============================================  IN: 1060 ml / OUT: 1050 ml / NET: 10 ml    I & Os for current day (as of 07 Jun 2017 17:22)  =============================================  IN: 460 ml / OUT: 700 ml / NET: -240 ml      Constitutional: No fever, fatigue or weight loss.  Skin: No rash.  Eyes: No recent vision problems or eye pain.  ENT: No congestion, ear pain, or sore throat.  Endocrine: No thyroid problems.  Cardiovascular: No chest pain or palpation.  Respiratory: No cough, shortness of breath, congestion, or wheezing.  Gastrointestinal: No abdominal pain, nausea, vomiting, or diarrhea.  Genitourinary: No dysuria.  Musculoskeletal: No joint swelling.  Neurologic: No headache.    PHYSICAL EXAM:  GENERAL: NAD, well-developed  HEAD:  Atraumatic, Normocephalic  EYES: EOMI, PERRLA, conjunctiva and sclera clear  NECK: Supple, No JVD  CHEST/LUNG: Clear to auscultation bilaterally; No wheeze  HEART: Regular rate and rhythm; No murmurs, rubs, or gallops  ABDOMEN: Soft, Nontender, Nondistended; Bowel sounds present  EXTREMITIES:  2+ Peripheral Pulses, No clubbing, cyanosis, or edema  PSYCH: AAOx3  NEUROLOGY: non-focal  SKIN: No rashes or lesions  LABS:                        13.4   6.86  )-----------( 167      ( 07 Jun 2017 07:30 )             41.7     06-07    144  |  103  |  55<H>  ----------------------------<  96  4.1   |  22  |  3.21<H>    Ca    9.6      07 Jun 2017 07:24  Phos  3.3     06-07  Mg     2.4     06-06        CARDIAC MARKERS ( 06 Jun 2017 16:43 )  x     / <0.01 ng/mL / 77 U/L / x     / 2.2 ng/mL          RADIOLOGY & ADDITIONAL TESTS:    Imaging Personally Reviewed:    Consultant(s) Notes Reviewed:      Care Discussed with Consultants/Other Providers:
cc: chest pain , shortness of breath     SUBJECTIVE / OVERNIGHT EVENTS: denies chest pain, shortness of breath, nausea, vomiting       MEDICATIONS  (STANDING):  aspirin enteric coated 81milliGRAM(s) Oral daily  montelukast 10milliGRAM(s) Oral daily  fluticasone propionate 50 MICROgram(s)/spray Nasal Spray 2Spray(s) Both Nostrils daily  diltiazem   CD 180milliGRAM(s) Oral daily  atorvastatin 10milliGRAM(s) Oral at bedtime  AQUAPHOR (petrolatum Ointment) 1Application(s) Topical two times a day  docusate sodium 100milliGRAM(s) Oral three times a day  senna 2Tablet(s) Oral at bedtime  amiodarone    Tablet 400milliGRAM(s) Oral every 8 hours  cholecalciferol 1000Unit(s) Oral daily  apixaban 5milliGRAM(s) Oral two times a day    MEDICATIONS  (PRN):      Vital Signs Last 24 Hrs  T(C): 36.3, Max: 36.9 (06-08 @ 04:23)  HR: 70 (70 - 77)  BP: 127/77 (108/70 - 127/82)  RR: 18 (16 - 18)  SpO2: 97% (96% - 97%)  Wt(kg): --  CAPILLARY BLOOD GLUCOSE    I&O's Summary  I & Os for 24h ending 08 Jun 2017 07:00  =============================================  IN: 1100 ml / OUT: 1650 ml / NET: -550 ml    I & Os for current day (as of 08 Jun 2017 13:09)  =============================================  IN: 360 ml / OUT: 0 ml / NET: 360 ml      Constitutional: No fever, fatigue or weight loss.  Skin: No rash.  Eyes: No recent vision problems or eye pain.  ENT: No congestion, ear pain, or sore throat.  Endocrine: No thyroid problems.  Cardiovascular: No chest pain or palpation.  Respiratory: No cough, shortness of breath, congestion, or wheezing.  Gastrointestinal: No abdominal pain, nausea, vomiting, or diarrhea.  Genitourinary: No dysuria.  Musculoskeletal: No joint swelling.  Neurologic: No headache.    PHYSICAL EXAM:  GENERAL: NAD, well-developed  HEAD:  Atraumatic, Normocephalic  EYES: EOMI, PERRLA, conjunctiva and sclera clear  NECK: Supple, No JVD  CHEST/LUNG: Clear to auscultation bilaterally; No wheeze  HEART: Regular rate and rhythm; No murmurs, rubs, or gallops  ABDOMEN: Soft, Nontender, Nondistended; Bowel sounds present  EXTREMITIES:  2+ Peripheral Pulses, No clubbing, cyanosis, or edema  Neuro : alert awake calm cooperative  SKIN: No rashes or lesions  LABS:                        13.6   8.29  )-----------( 190      ( 08 Jun 2017 07:29 )             42.1     06-08    139  |  99  |  52<H>  ----------------------------<  111<H>  4.4   |  23  |  3.06<H>    Ca    9.4      08 Jun 2017 07:41  Phos  3.5     06-08  Mg     2.4     06-08        CARDIAC MARKERS ( 06 Jun 2017 16:43 )  x     / <0.01 ng/mL / 77 U/L / x     / 2.2 ng/mL          RADIOLOGY & ADDITIONAL TESTS:    Imaging Personally Reviewed:    Consultant(s) Notes Reviewed:      Care Discussed with Consultants/Other Providers:
HPI:  He reports feeling well and still has some chest discomfort. No orthopnea or PND.    PAST MEDICAL & SURGICAL HISTORY:  Chronic kidney disease, unspecified stage  Afib  Psoriasis  Irregular heart rhythm  High cholesterol  Hypertension  Aneurysm artery, iliac: right iliac   S/P ablation of ventricular arrhythmia: septal ablation   Hernia: right inguinal hernia repair   AICD (automatic cardioverter/defibrillator) present:       Medications:  aspirin enteric coated 81milliGRAM(s) Oral daily  montelukast 10milliGRAM(s) Oral daily  fluticasone propionate 50 MICROgram(s)/spray Nasal Spray 2Spray(s) Both Nostrils daily  diltiazem   CD 180milliGRAM(s) Oral daily  atorvastatin 10milliGRAM(s) Oral at bedtime  AQUAPHOR (petrolatum Ointment) 1Application(s) Topical two times a day  docusate sodium 100milliGRAM(s) Oral three times a day  senna 2Tablet(s) Oral at bedtime  apixaban 2.5milliGRAM(s) Oral two times a day      Vitals:  T(C): 36.7, Max: 36.7 ( @ 14:16)  HR: 75 (68 - 75)  BP: 127/82 (112/74 - 127/82)  BP(mean): --  RR: 16 (16 - 18)  SpO2: 96% (96% - 97%)  Wt(kg): --  Daily     Daily Weight in k.6 (2017 07:32)  I&O's Summary  I & Os for 24h ending 2017 07:00  =============================================  IN: 1060 ml / OUT: 1050 ml / NET: 10 ml    I & Os for current day (as of 2017 17:07)  =============================================  IN: 460 ml / OUT: 700 ml / NET: -240 ml      Physical Exam:  Appearance: NAD  HEENT: No icterus or JVD  Cardiovascular: Regular rhythm, normal S1, S2, No murmurs or rubs  Respiratory: clear to ascultation bilaterally, no crackles or wheeze.  Gastrointestinal: +BS, soft  Musculoskeletal: No clubbing  Neurologic: Non-focal, alert and oriented, Mood & affect appropriate  Lymphatic: No lymphadenopathy  Skin: Warm and moist, no cyanosis    CBC Full  -  ( 2017 07:30 )  WBC Count : 6.86 K/uL  Hemoglobin : 13.4 g/dL  Hematocrit : 41.7 %  Platelet Count - Automated : 167 K/uL  Mean Cell Volume : 88.9 fl  Mean Cell Hemoglobin : 28.6 pg  Mean Cell Hemoglobin Concentration : 32.1 gm/dL  Auto Neutrophil # : x  Auto Lymphocyte # : x  Auto Monocyte # : x  Auto Eosinophil # : x  Auto Basophil # : x  Auto Neutrophil % : x  Auto Lymphocyte % : x  Auto Monocyte % : x  Auto Eosinophil % : x  Auto Basophil % : x    -    144  |  103  |  55<H>  ----------------------------<  96  4.1   |  22  |  3.21<H>    Ca    9.6      2017 07:24  Phos  3.3     06-  Mg     2.4     06-06        CARDIAC MARKERS ( 2017 16:43 )  x     / <0.01 ng/mL / 77 U/L / x     / 2.2 ng/mL      Serum Pro-Brain Natriuretic Peptide: 3681 pg/mL ( @ 15:22)    Total Cholesterol: 122  LDL: 72  HDL: 37  T        ECG:  Diagnosis Line DEMAND PACEMAKER; INTERPRETATION IS BASED ON INTRINSIC RHYTHM  ATRIAL FIBRILLATION WITH PREMATURE VENTRICULAR OR ABERRANTLY CONDUCTED COMPLEXES  RIGHT BUNDLE BRANCH BLOCK  T WAVE ABNORMALITY, CONSIDER INFEROLATERAL ISCHEMIA OR DIGITALIS EFFECT    Echo:Conclusions:  1. Mitral annular calcification and thickened mitral  leaflets. Mild mitral regurgitation.  2. Calcified trileaflet aortic valve with normal opening.  Minimal aortic regurgitation.  3. Normal left ventricular internal dimensions and wall  thicknesses.  4. Normal left ventricular systolic function. No segmental  wall motion abnormalities.  5. Normal right atrium.  6. Normal right ventricular size and systolic function. A  device wire is noted in the right heart.    Stress Testing: IMPRESSIONS:Abnormal Study  * Chest Pain: Developed chest discomfort at 03:00 min of  infusion at a HR of 82 which resolved by 13 minutes of  post infusion, after termination of infusion.  * Symptom: atypical angina.  * HR Response: Appropriate.  * BP Response: Appropriate.  * Heart Rhythm: Atrial Fibrillation - 72 BPM.  * ECG Abnormalities: ECG changes could not be interpreted  due to paced rhythm.  * Arrhythmia: None.  * Review of raw data shows: The study is of good technical  quality.  * The left ventricle was normal in size. There is a small  mild fixed defect in the basal septum suggestive of scar.  There is medium sized mild defect in the inferior wall,  mid to distal inferoseptum and apex that predominantly  corrects with prone imaging and has normal wall motion  suggestive of attnuation artifact.  * Post-stress gated wall motion analysis was performed  (LVEF > 70%;LVEDV = 74 ml.) The basal septum appears  hypokinetic.    Cath:    Imaging:    Interpretation of Telemetry:
[facility] DIVISION OF KIDNEY DISEASES AND HYPERTENSION -- FOLLOW UP NOTE  --------------------------------------------------------------------------------  Chief Complaint: ROSEANNE/CKD    24 hour events/subjective:    No acute events overnight. Pt. feels well.     PAST HISTORY  --------------------------------------------------------------------------------  No significant changes to PMH, PSH, FHx, SHx, unless otherwise noted    ALLERGIES & MEDICATIONS  --------------------------------------------------------------------------------  Allergies    No Known Allergies    Intolerances      Standing Inpatient Medications  aspirin enteric coated 81milliGRAM(s) Oral daily  montelukast 10milliGRAM(s) Oral daily  fluticasone propionate 50 MICROgram(s)/spray Nasal Spray 2Spray(s) Both Nostrils daily  diltiazem   CD 180milliGRAM(s) Oral daily  atorvastatin 10milliGRAM(s) Oral at bedtime  AQUAPHOR (petrolatum Ointment) 1Application(s) Topical two times a day  docusate sodium 100milliGRAM(s) Oral three times a day  senna 2Tablet(s) Oral at bedtime  amiodarone    Tablet 400milliGRAM(s) Oral every 8 hours  cholecalciferol 1000Unit(s) Oral daily  apixaban 5milliGRAM(s) Oral two times a day  apixaban 2.5milliGRAM(s) Oral once    PRN Inpatient Medications      REVIEW OF SYSTEMS  --------------------------------------------------------------------------------  Pt. denies CP/SOB/N/V/diarrhea/dysuria     VITALS/PHYSICAL EXAM  --------------------------------------------------------------------------------  T(C): 36.9, Max: 36.9 (06-08 @ 04:23)  HR: 75 (70 - 77)  BP: 108/70 (108/70 - 127/82)  RR: 18 (16 - 18)  SpO2: 97% (96% - 97%)  Wt(kg): --      I & Os for 24h ending 06-08-17 @ 07:00  =============================================  IN: 1100 ml / OUT: 1650 ml / NET: -550 ml    I & Os for current day (as of 06-08-17 @ 09:09)  =============================================  IN: 360 ml / OUT: 0 ml / NET: 360 ml    Physical Exam:  	Gen: NAD,   	HEENT: PERRL,   	Pulm: CTA B/L  	CV: RRR,              Abd: +BS, soft,   	LE: Warm,  no edema  	Skin: Warm, without rashes      LABS/STUDIES  --------------------------------------------------------------------------------              13.6   8.29  >-----------<  190      [06-08-17 @ 07:29]              42.1     139  |  99  |  52  ----------------------------<  111      [06-08-17 @ 07:41]  4.4   |  23  |  3.06        Ca     9.4     [06-08-17 @ 07:41]      Mg     2.4     [06-08-17 @ 07:41]      Phos  3.5     [06-08-17 @ 07:41]          Troponin <0.01      [06-06-17 @ 16:43]  CK 77      [06-06-17 @ 16:43]    Creatinine Trend:  SCr 3.06 [06-08 @ 07:41]  SCr 3.21 [06-07 @ 07:24]  SCr 3.70 [06-06 @ 07:33]  SCr 3.60 [06-05 @ 15:22]        Vitamin D (25OH) 64.4      [06-07-17 @ 07:20]  Lipid: chol 122, TG 67, HDL 37, LDL 72      [06-07-17 @ 07:24]

## 2017-06-08 NOTE — DISCHARGE NOTE ADULT - CARE PROVIDER_API CALL
Yani Roberts), Internal Medicine; Nephrology  [address] [place] [address]  Phone: [phone]  Fax: [phone]    Lisker, Jay J (MD), Cardiovascular Disease; Internal Medicine  [address] [place] [address]  Phone: [phone]  Fax: [phone]    Paresh Koo (JAVIER), Internal Medicine  [address] [place] [address]  Phone: [phone]  Fax: [phone]

## 2017-06-08 NOTE — DISCHARGE NOTE ADULT - REASON FOR ADMISSION
Chest pain and Shortness of Breath - acute kidney injury due to overdiuresis, chronic atrial fibrillation requiring anticoagulation & Amiodarone loading, chronotropic incompetency requiring pacemaker setting adjustment on AICD

## 2017-06-08 NOTE — DISCHARGE NOTE ADULT - MEDICATION SUMMARY - MEDICATIONS TO TAKE
I will START or STAY ON the medications listed below when I get home from the hospital:    aspirin 81 mg oral delayed release tablet  -- 1 tab(s) by mouth once a day  -- Indication: For Cardiac protection    Diltiazem Hydrochloride  mg/24 hours oral capsule, extended release  -- 1 cap(s) by mouth once a day  -- Indication: For Chronic atrial fibrillation heart rate control    amiodarone 200 mg oral tablet  -- 2 tab(s) = 400 mg  by mouth every 8 hours x 9 more doses then decreased to 1 tab = 200 mg daily  -- Indication: For Chronic atrial fibrillation    apixaban 5 mg oral tablet  -- 1 tab(s) by mouth 2 times a day  -- Indication: For Chronic atrial fibrillation & stroke prevention    gabapentin 300 mg oral capsule  -- 1 tab(s) by mouth , As Needed  -- Indication: For neuropathy    pravastatin 10 mg oral tablet  -- 1 tab(s) by mouth once a day  -- Indication: For High cholesterol    petrolatum topical ointment  -- 1 application on skin 2 times a day  -- Indication: For Skin emollient    montelukast 10 mg oral tablet  -- 1 tab(s) by mouth once a day  -- Indication: For Allergy management    Flonase 50 mcg/inh nasal spray  -- 2 spray(s) each nostil into nose once a day  -- Indication: For Allergy management    cholecalciferol oral tablet  -- 1000 unit(s) by mouth once a day  -- Indication: For vitamin D supplement

## 2017-06-08 NOTE — DISCHARGE NOTE ADULT - PATIENT PORTAL LINK FT
“You can access the [facility] Patient Portal, offered by [facility], by registering with the following website: [email or web address]”

## 2017-06-08 NOTE — PROGRESS NOTE ADULT - PROBLEM SELECTOR PLAN 1
Pacer reprogrammed for rate responsiveness in AF.  Will continue to load amio 400 tid for 12 doses then evaluate need for cardioversion.  continue eliquis 5 bid   Reviewed the possible interactions of amio including precautions for skin protection.  He may go home to day.  Care plan reviewed with PMD. Dr. Becker

## 2017-06-08 NOTE — DISCHARGE NOTE ADULT - CARE PLAN
Principal Discharge DX:	Acute on chronic kidney failure  Goal:	return of creatinine to baseline  Instructions for follow-up, activity and diet:	Avoid taking (NSAIDs) - (ex: Ibuprofen, Advil, Celebrex, Naprosyn)  Avoid taking any nephrotoxic agents (can harm kidneys) - Intravenous contrast for diagnostic testing, combination cold medications.  Have all medications adjusted for your renal function by your Health Care Provider.  Blood pressure control is important.  Take all medication as prescribed.  follow up with renal and cardiology in one week for diuretic instructions - HOLD at this time  follow up with primary care physician within one week after discharge  Secondary Diagnosis:	Chronic atrial fibrillation  Instructions for follow-up, activity and diet:	Atrial fibrillation is the most common heart rhythm problem & has the risk of stroke & heart attack  It helps if you control your blood pressure, not drink more than 1-2 alcohol drinks per day, cut down on caffeine, getting treatment for over active thyroid gland, & getting exercise  Call your doctor if you feel your heart racing or beating unusually, chest tightness or pain, lightheaded, faint, shortness of breath especially with exercise  It is important to take your heart medication as prescribed  You are on Eliquis for anticoagulation  Eliquis/Apixaban is used to thin the blood so clots will not form and to keep existing ones from getting bigger.  Take this medication daily as prescribed by your health care provider.  Take this medication with food to prevent upset stomach.  If you miss a dose call your health care provider or pharmacist right away.  Tell your doctor you use this drug before you have a spinal or epidural procedure  Tell dentists, surgeon, and other doctors that you use this drug.  You may bleed more easily.  Be careful and avoid injury.  Use a soft toothbrush and an electric razor.  Secondary Diagnosis:	Chronotropic incompetence  Instructions for follow-up, activity and diet:	your pacemaker settings were changed in your AICD to correct the problem

## 2017-06-08 NOTE — PROGRESS NOTE ADULT - PROBLEM SELECTOR PLAN 4
slowly improving, Lasix on hold as per renal, renal cleared pt for dc with out pt c/u
slowly improving, Lasix on hold

## 2017-06-08 NOTE — DISCHARGE NOTE ADULT - HOSPITAL COURSE
to be completed by attending physician on record 81 year old year old male with PMHx of HTN, HLD, Afib s/p ablation, on Xarelto, CHF with AICD/PPM (Spruik), CKD baseline with creatinine 2.5, presenting for admission for IV diuresis. pt complaining of exertional chest pressure, SOB, CASAS, LE edema increasing in last few weeks. Has been increasing doses of Lasix and recently switched to Bumex with no increase in urinary output but creatinine increased.                                                                                  Admit  Dx:                    2) Chronic A. Fib on diltiazem & Amiodarone loading                   3)  ROSEANNE. 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

## 2017-06-08 NOTE — PROGRESS NOTE ADULT - PROBLEM SELECTOR PROBLEM 2
Essential hypertension
Paroxysmal atrial fibrillation
Exertional dyspnea
Paroxysmal atrial fibrillation
Essential hypertension
Exertional dyspnea

## 2017-06-08 NOTE — PROGRESS NOTE ADULT - PROBLEM SELECTOR PROBLEM 1
Exertional dyspnea
Stage 3 chronic kidney disease
Paroxysmal atrial fibrillation
Exertional dyspnea
Stage 3 chronic kidney disease
Paroxysmal atrial fibrillation

## 2017-06-19 ENCOUNTER — APPOINTMENT (OUTPATIENT)
Dept: NEPHROLOGY | Facility: CLINIC | Age: 82
End: 2017-06-19

## 2017-06-19 VITALS
OXYGEN SATURATION: 98 % | DIASTOLIC BLOOD PRESSURE: 98 MMHG | SYSTOLIC BLOOD PRESSURE: 160 MMHG | HEIGHT: 71 IN | WEIGHT: 220 LBS | HEART RATE: 80 BPM | BODY MASS INDEX: 30.8 KG/M2

## 2017-06-19 VITALS — DIASTOLIC BLOOD PRESSURE: 90 MMHG | HEART RATE: 80 BPM | SYSTOLIC BLOOD PRESSURE: 160 MMHG

## 2017-06-19 RX ORDER — ASPIRIN 81 MG/1
81 TABLET ORAL
Refills: 0 | Status: DISCONTINUED | COMMUNITY
End: 2017-06-19

## 2017-06-21 LAB
ALBUMIN SERPL ELPH-MCNC: 4.5 G/DL
ANION GAP SERPL CALC-SCNC: 15 MMOL/L
APPEARANCE: CLEAR
BACTERIA: NEGATIVE
BILIRUBIN URINE: NEGATIVE
BLOOD URINE: ABNORMAL
BUN SERPL-MCNC: 39 MG/DL
CALCIUM SERPL-MCNC: 9.9 MG/DL
CHLORIDE SERPL-SCNC: 104 MMOL/L
CO2 SERPL-SCNC: 22 MMOL/L
COLOR: YELLOW
CREAT SERPL-MCNC: 3.17 MG/DL
CREAT SPEC-SCNC: 206 MG/DL
GLUCOSE QUALITATIVE U: NORMAL MG/DL
GLUCOSE SERPL-MCNC: 111 MG/DL
HYALINE CASTS: 6 /LPF
KETONES URINE: NEGATIVE
LEUKOCYTE ESTERASE URINE: NEGATIVE
MICROALBUMIN 24H UR DL<=1MG/L-MCNC: 9.9 MG/DL
MICROALBUMIN/CREAT 24H UR-RTO: 48 MG/G
MICROSCOPIC-UA: NORMAL
NITRITE URINE: NEGATIVE
PH URINE: 5.5
PHOSPHATE SERPL-MCNC: 3 MG/DL
POTASSIUM SERPL-SCNC: 4.8 MMOL/L
PROTEIN URINE: 30 MG/DL
RED BLOOD CELLS URINE: 16 /HPF
SODIUM SERPL-SCNC: 141 MMOL/L
SPECIFIC GRAVITY URINE: 1.02
SQUAMOUS EPITHELIAL CELLS: 1 /HPF
URATE SERPL-MCNC: 9.3 MG/DL
UROBILINOGEN URINE: NORMAL MG/DL
WHITE BLOOD CELLS URINE: 3 /HPF

## 2017-07-19 ENCOUNTER — OUTPATIENT (OUTPATIENT)
Dept: OUTPATIENT SERVICES | Facility: HOSPITAL | Age: 82
LOS: 1 days | Discharge: HOME | End: 2017-07-19
Payer: MEDICARE

## 2017-07-19 DIAGNOSIS — I48.91 UNSPECIFIED ATRIAL FIBRILLATION: ICD-10-CM

## 2017-07-19 LAB
ANION GAP SERPL CALC-SCNC: 14 MMOL/L — SIGNIFICANT CHANGE UP (ref 5–17)
APTT BLD: 33.9 SEC — SIGNIFICANT CHANGE UP (ref 27.5–37.4)
BUN SERPL-MCNC: 37 MG/DL — HIGH (ref 7–23)
CALCIUM SERPL-MCNC: 9.1 MG/DL — SIGNIFICANT CHANGE UP (ref 8.4–10.5)
CHLORIDE SERPL-SCNC: 105 MMOL/L — SIGNIFICANT CHANGE UP (ref 96–108)
CO2 SERPL-SCNC: 23 MMOL/L — SIGNIFICANT CHANGE UP (ref 22–31)
CREAT SERPL-MCNC: 3.1 MG/DL — HIGH (ref 0.5–1.3)
GLUCOSE SERPL-MCNC: 106 MG/DL — HIGH (ref 70–99)
HCT VFR BLD CALC: 42.6 % — SIGNIFICANT CHANGE UP (ref 39–50)
HGB BLD-MCNC: 14.3 G/DL — SIGNIFICANT CHANGE UP (ref 13–17)
INR BLD: 1.32 — HIGH (ref 0.88–1.16)
MCHC RBC-ENTMCNC: 28.9 PG — SIGNIFICANT CHANGE UP (ref 27–34)
MCHC RBC-ENTMCNC: 33.6 G/DL — SIGNIFICANT CHANGE UP (ref 32–36)
MCV RBC AUTO: 86.1 FL — SIGNIFICANT CHANGE UP (ref 80–100)
PLATELET # BLD AUTO: 167 K/UL — SIGNIFICANT CHANGE UP (ref 150–400)
POTASSIUM SERPL-MCNC: 4.6 MMOL/L — SIGNIFICANT CHANGE UP (ref 3.5–5.3)
POTASSIUM SERPL-SCNC: 4.6 MMOL/L — SIGNIFICANT CHANGE UP (ref 3.5–5.3)
PROTHROM AB SERPL-ACNC: 14.7 SEC — HIGH (ref 9.8–12.7)
RBC # BLD: 4.95 M/UL — SIGNIFICANT CHANGE UP (ref 4.2–5.8)
RBC # FLD: 14.6 % — SIGNIFICANT CHANGE UP (ref 10.3–16.9)
SODIUM SERPL-SCNC: 142 MMOL/L — SIGNIFICANT CHANGE UP (ref 135–145)
WBC # BLD: 8.4 K/UL — SIGNIFICANT CHANGE UP (ref 3.8–10.5)
WBC # FLD AUTO: 8.4 K/UL — SIGNIFICANT CHANGE UP (ref 3.8–10.5)

## 2017-07-19 PROCEDURE — 93320 DOPPLER ECHO COMPLETE: CPT | Mod: 26

## 2017-07-19 PROCEDURE — 93312 ECHO TRANSESOPHAGEAL: CPT

## 2017-07-19 PROCEDURE — 93312 ECHO TRANSESOPHAGEAL: CPT | Mod: 26

## 2017-07-19 PROCEDURE — 85610 PROTHROMBIN TIME: CPT

## 2017-07-19 PROCEDURE — 85027 COMPLETE CBC AUTOMATED: CPT

## 2017-07-19 PROCEDURE — 92960 CARDIOVERSION ELECTRIC EXT: CPT

## 2017-07-19 PROCEDURE — 85730 THROMBOPLASTIN TIME PARTIAL: CPT

## 2017-07-19 PROCEDURE — 80048 BASIC METABOLIC PNL TOTAL CA: CPT

## 2017-07-19 PROCEDURE — 93325 DOPPLER ECHO COLOR FLOW MAPG: CPT | Mod: 26

## 2017-07-19 NOTE — PROGRESS NOTE ADULT - SUBJECTIVE AND OBJECTIVE BOX
Cardiac Electrophysiology Admission Note    History of Present Illness:  Mr. Roberson is a pleasant 81 year old male with CKD, HLD, HTN, HOCM with ICD, and atrial fibrillation on eliquis who presents for elective MARY/cardioversion.    Mr. Roberson states that he had been experiencing worsening CASAS and was found to have atrial fibrillation.  He was started on ELiquis and now presents for elective MARY/Cardioversion.  He denies syncope, near syncope, chest pain, palpitations  S/P MARY without clot  Past Medical History:  as above   Past Surgical History:  as above + hernia repair   Family History: Non-contributory  Social History: denies smoking, drugs.  Social ETOH (0-5 drinks a week)  Allergies: NKDA  Medications:Amiodarone, Eliquis 5mg BID, Cardizem 180 daily, pravastatin, singlular, flonase, vitamin D, clonidine  Physical:   HR: 95		BP: 125/78		   GEN: NAD  HEENT: no JVD  CARDS: Irregularly Irregular  LUNGS: CTAB no w/r/r  EXT: no edema  NEURO: no deficit noted    EKG: afib  A/P:  Mr. Roberson is a pleasant 81 year old male with CKD, HLD, HTN, HOCM with ICD, and atrial fibrillation on eliquis who presents for elective MARY/cardioversion.  NPO and consented.  Patient with no further questions or concerns.

## 2017-08-04 ENCOUNTER — APPOINTMENT (OUTPATIENT)
Dept: OPHTHALMOLOGY | Facility: CLINIC | Age: 82
End: 2017-08-04
Payer: MEDICARE

## 2017-08-04 PROCEDURE — 92083 EXTENDED VISUAL FIELD XM: CPT

## 2017-09-07 ENCOUNTER — OUTPATIENT (OUTPATIENT)
Dept: OUTPATIENT SERVICES | Facility: HOSPITAL | Age: 82
LOS: 1 days | End: 2017-09-07
Payer: MEDICARE

## 2017-09-07 VITALS
HEIGHT: 70 IN | RESPIRATION RATE: 16 BRPM | OXYGEN SATURATION: 97 % | DIASTOLIC BLOOD PRESSURE: 88 MMHG | SYSTOLIC BLOOD PRESSURE: 169 MMHG | TEMPERATURE: 98 F | WEIGHT: 218.04 LBS | HEART RATE: 65 BPM

## 2017-09-07 DIAGNOSIS — Z01.818 ENCOUNTER FOR OTHER PREPROCEDURAL EXAMINATION: ICD-10-CM

## 2017-09-07 DIAGNOSIS — H02.831 DERMATOCHALASIS OF RIGHT UPPER EYELID: ICD-10-CM

## 2017-09-07 DIAGNOSIS — H02.834 DERMATOCHALASIS OF LEFT UPPER EYELID: ICD-10-CM

## 2017-09-07 DIAGNOSIS — H02.123: ICD-10-CM

## 2017-09-07 LAB
ANION GAP SERPL CALC-SCNC: 16 MMOL/L — SIGNIFICANT CHANGE UP (ref 5–17)
BUN SERPL-MCNC: 36 MG/DL — HIGH (ref 7–23)
CALCIUM SERPL-MCNC: 9.3 MG/DL — SIGNIFICANT CHANGE UP (ref 8.4–10.5)
CHLORIDE SERPL-SCNC: 104 MMOL/L — SIGNIFICANT CHANGE UP (ref 96–108)
CO2 SERPL-SCNC: 22 MMOL/L — SIGNIFICANT CHANGE UP (ref 22–31)
CREAT SERPL-MCNC: 2.78 MG/DL — HIGH (ref 0.5–1.3)
GLUCOSE SERPL-MCNC: 89 MG/DL — SIGNIFICANT CHANGE UP (ref 70–99)
HCT VFR BLD CALC: 43.5 % — SIGNIFICANT CHANGE UP (ref 39–50)
HGB BLD-MCNC: 14.4 G/DL — SIGNIFICANT CHANGE UP (ref 13–17)
MCHC RBC-ENTMCNC: 28.7 PG — SIGNIFICANT CHANGE UP (ref 27–34)
MCHC RBC-ENTMCNC: 33.1 GM/DL — SIGNIFICANT CHANGE UP (ref 32–36)
MCV RBC AUTO: 86.8 FL — SIGNIFICANT CHANGE UP (ref 80–100)
PLATELET # BLD AUTO: 176 K/UL — SIGNIFICANT CHANGE UP (ref 150–400)
POTASSIUM SERPL-MCNC: 4.4 MMOL/L — SIGNIFICANT CHANGE UP (ref 3.5–5.3)
POTASSIUM SERPL-SCNC: 4.4 MMOL/L — SIGNIFICANT CHANGE UP (ref 3.5–5.3)
RBC # BLD: 5.01 M/UL — SIGNIFICANT CHANGE UP (ref 4.2–5.8)
RBC # FLD: 14.4 % — SIGNIFICANT CHANGE UP (ref 10.3–14.5)
SODIUM SERPL-SCNC: 142 MMOL/L — SIGNIFICANT CHANGE UP (ref 135–145)
WBC # BLD: 7.98 K/UL — SIGNIFICANT CHANGE UP (ref 3.8–10.5)
WBC # FLD AUTO: 7.98 K/UL — SIGNIFICANT CHANGE UP (ref 3.8–10.5)

## 2017-09-07 PROCEDURE — 85027 COMPLETE CBC AUTOMATED: CPT

## 2017-09-07 PROCEDURE — 80048 BASIC METABOLIC PNL TOTAL CA: CPT

## 2017-09-07 PROCEDURE — G0463: CPT

## 2017-09-07 RX ORDER — GABAPENTIN 400 MG/1
1 CAPSULE ORAL
Qty: 0 | Refills: 0 | COMMUNITY

## 2017-09-07 RX ORDER — FLUTICASONE PROPIONATE 50 MCG
2 SPRAY, SUSPENSION NASAL
Qty: 0 | Refills: 0 | COMMUNITY

## 2017-09-07 RX ORDER — SODIUM CHLORIDE 9 MG/ML
3 INJECTION INTRAMUSCULAR; INTRAVENOUS; SUBCUTANEOUS EVERY 8 HOURS
Qty: 0 | Refills: 0 | Status: DISCONTINUED | OUTPATIENT
Start: 2017-09-18 | End: 2017-10-03

## 2017-09-07 NOTE — H&P PST ADULT - NS MD HP INPLANTS MED DEV
Model [employer] Model E110 , Interrogated 9/6/17/Automatic Implantable Cardioverter Defibrillator

## 2017-09-07 NOTE — H&P PST ADULT - PMH
Afib    Chronic kidney disease, unspecified stage    High cholesterol    Hypertension    Psoriasis Afib    Chest pressure  Pt went to ER Chest pressure 6/2017, reports cardiac workup was negative  Chronic kidney disease, unspecified stage    Dermatochalasis of both upper eyelids    High cholesterol    Hypertension    Mechanical ectropion of right eye, unspecified eyelid    Psoriasis    Spinal stenosis of lumbar region

## 2017-09-07 NOTE — H&P PST ADULT - PSH
AICD (automatic cardioverter/defibrillator) present  2009  Aneurysm artery, iliac  right iliac 2011  Hernia  right inguinal hernia repair 2000  S/P ablation of ventricular arrhythmia  septal ablation 2003 AICD (automatic cardioverter/defibrillator) present  2009  Aneurysm artery, iliac  right iliac 2011  Atrial fibrillation status post cardioversion  7/18/17 [facility]  Hernia  right inguinal hernia repair 2000  S/P ablation of ventricular arrhythmia  septal ablation 2003

## 2017-09-07 NOTE — H&P PST ADULT - HISTORY OF PRESENT ILLNESS
82M w/ Afib 5 years on Eliquis,  HOCM, s/p Cardiac ablation 2003  )  w/ AICD/PPM (2009), CKD (Recently hospitalized [facility] 6/2017 for Chest Pressure &  CKD),  Cardioversion [facility] 7/18/17.  Right  iliac stent for aneurysm 5- 6 yr ago , HTN, HLD. C/O Bilateral Ptosis . Present Bilateral Ptosis repair, Bilateral upper blepharoplasty. 82M w/ Afib 5 years on Eliquis,  HOCM, s/p Cardiac ablation 2003  )  w/ AICD/PPM (2009), CKD (Creat 3.1) (Recently hospitalized [facility] 6/2017 for Chest Pressure &  CKD),  Cardioversion [facility] 7/18/17.  Right  iliac stent for aneurysm 5- 6 yr ago , HTN, HLD. C/O Bilateral Ptosis . Present Bilateral Ptosis repair, Bilateral upper blepharoplasty.

## 2017-09-07 NOTE — H&P PST ADULT - NSANTHOSAYNRD_GEN_A_CORE
No. EMILIE screening performed.  STOP BANG Legend: 0-2 = LOW Risk; 3-4 = INTERMEDIATE Risk; 5-8 = HIGH Risk

## 2017-09-07 NOTE — H&P PST ADULT - PROBLEM SELECTOR PLAN 4
Obtain copy of Interrogation report from yesterday Dr Fraser . Obtain copy of Interrogation report from yesterday Dr Fraser .  Advised Dr Vega pt history and pt can  proceed with surgery in Ambulatory.

## 2017-09-07 NOTE — H&P PST ADULT - ATTENDING COMMENTS
For bilateral upper eyelid ptosis and blepharoplasty.  Risks, benefits, options reviewed.  All questions answered.  Eliquis issue noted by Dr. Magana.

## 2017-09-08 ENCOUNTER — TRANSCRIPTION ENCOUNTER (OUTPATIENT)
Age: 82
End: 2017-09-08

## 2017-09-08 PROBLEM — R07.89 OTHER CHEST PAIN: Chronic | Status: ACTIVE | Noted: 2017-09-07

## 2017-09-15 ENCOUNTER — APPOINTMENT (OUTPATIENT)
Dept: NEPHROLOGY | Facility: CLINIC | Age: 82
End: 2017-09-15
Payer: MEDICARE

## 2017-09-15 VITALS
BODY MASS INDEX: 29.26 KG/M2 | WEIGHT: 209 LBS | DIASTOLIC BLOOD PRESSURE: 86 MMHG | OXYGEN SATURATION: 97 % | HEIGHT: 71 IN | SYSTOLIC BLOOD PRESSURE: 159 MMHG | HEART RATE: 72 BPM

## 2017-09-15 VITALS — DIASTOLIC BLOOD PRESSURE: 84 MMHG | SYSTOLIC BLOOD PRESSURE: 144 MMHG | HEART RATE: 70 BPM

## 2017-09-15 PROBLEM — H02.831 DERMATOCHALASIS OF RIGHT UPPER EYELID: Chronic | Status: ACTIVE | Noted: 2017-09-07

## 2017-09-15 PROBLEM — M48.06 SPINAL STENOSIS, LUMBAR REGION: Chronic | Status: ACTIVE | Noted: 2017-09-07

## 2017-09-15 PROBLEM — H02.123: Chronic | Status: ACTIVE | Noted: 2017-09-07

## 2017-09-15 PROCEDURE — 99214 OFFICE O/P EST MOD 30 MIN: CPT

## 2017-09-15 RX ORDER — APIXABAN 2.5 MG/1
2.5 TABLET, FILM COATED ORAL
Refills: 0 | Status: ACTIVE | COMMUNITY

## 2017-09-18 ENCOUNTER — OUTPATIENT (OUTPATIENT)
Dept: OUTPATIENT SERVICES | Facility: HOSPITAL | Age: 82
LOS: 1 days | End: 2017-09-18
Payer: MEDICARE

## 2017-09-18 ENCOUNTER — TRANSCRIPTION ENCOUNTER (OUTPATIENT)
Age: 82
End: 2017-09-18

## 2017-09-18 VITALS
OXYGEN SATURATION: 100 % | HEART RATE: 65 BPM | TEMPERATURE: 97 F | SYSTOLIC BLOOD PRESSURE: 162 MMHG | DIASTOLIC BLOOD PRESSURE: 92 MMHG | RESPIRATION RATE: 18 BRPM

## 2017-09-18 DIAGNOSIS — H02.123: ICD-10-CM

## 2017-09-18 DIAGNOSIS — H02.833 DERMATOCHALASIS OF RIGHT EYE, UNSPECIFIED EYELID: ICD-10-CM

## 2017-09-18 DIAGNOSIS — I10 ESSENTIAL (PRIMARY) HYPERTENSION: ICD-10-CM

## 2017-09-18 DIAGNOSIS — I48.0 PAROXYSMAL ATRIAL FIBRILLATION: ICD-10-CM

## 2017-09-18 DIAGNOSIS — I48.91 UNSPECIFIED ATRIAL FIBRILLATION: Chronic | ICD-10-CM

## 2017-09-18 DIAGNOSIS — Z95.810 PRESENCE OF AUTOMATIC (IMPLANTABLE) CARDIAC DEFIBRILLATOR: ICD-10-CM

## 2017-09-18 DIAGNOSIS — H02.834 DERMATOCHALASIS OF LEFT UPPER EYELID: ICD-10-CM

## 2017-09-18 DIAGNOSIS — H02.831 DERMATOCHALASIS OF RIGHT UPPER EYELID: ICD-10-CM

## 2017-09-18 PROCEDURE — 67903 REPAIR EYELID DEFECT: CPT | Mod: LT

## 2017-09-18 PROCEDURE — 67908 REPAIR EYELID DEFECT: CPT | Mod: RT

## 2017-09-18 RX ORDER — CELECOXIB 200 MG/1
200 CAPSULE ORAL ONCE
Qty: 0 | Refills: 0 | Status: DISCONTINUED | OUTPATIENT
Start: 2017-09-18 | End: 2017-09-18

## 2017-09-18 RX ORDER — SODIUM CHLORIDE 9 MG/ML
1000 INJECTION INTRAMUSCULAR; INTRAVENOUS; SUBCUTANEOUS
Qty: 0 | Refills: 0 | Status: DISCONTINUED | OUTPATIENT
Start: 2017-09-18 | End: 2017-10-03

## 2017-09-18 RX ORDER — ONDANSETRON 8 MG/1
4 TABLET, FILM COATED ORAL ONCE
Qty: 0 | Refills: 0 | Status: DISCONTINUED | OUTPATIENT
Start: 2017-09-18 | End: 2017-10-03

## 2017-09-18 RX ORDER — FAMOTIDINE 10 MG/ML
0 INJECTION INTRAVENOUS
Qty: 0 | Refills: 0 | COMMUNITY

## 2017-09-18 RX ORDER — OXYCODONE HYDROCHLORIDE 5 MG/1
5 TABLET ORAL ONCE
Qty: 0 | Refills: 0 | Status: DISCONTINUED | OUTPATIENT
Start: 2017-09-18 | End: 2017-09-18

## 2017-09-18 NOTE — PRE-ANESTHESIA EVALUATION ADULT - NSANTHPMHFT_GEN_ALL_CORE
Ablation June 2017  AICD check June 2 years life  Ok to stop eloquis 3 days prior as per cardio  Optimized by PCP. Cardiac studies in the chart

## 2017-09-18 NOTE — ASU PATIENT PROFILE, ADULT - PSH
AICD (automatic cardioverter/defibrillator) present  2009  Aneurysm artery, iliac  right iliac 2011  Atrial fibrillation status post cardioversion  7/18/17 [facility]  Hernia  right inguinal hernia repair 2000  S/P ablation of ventricular arrhythmia  septal ablation 2003

## 2017-09-18 NOTE — ASU DISCHARGE PLAN (ADULT/PEDIATRIC). - NOTIFY
Fever greater than 101/Pain not relieved by Medications/Persistent Nausea and Vomiting/Bleeding that does not stop/Swelling that continues/Unable to Urinate

## 2017-09-18 NOTE — ASU PATIENT PROFILE, ADULT - PMH
Afib    Chest pressure  Pt went to ER Chest pressure 6/2017, reports cardiac workup was negative  Chronic kidney disease, unspecified stage    Dermatochalasis of both upper eyelids    High cholesterol    Hypertension    Mechanical ectropion of right eye, unspecified eyelid    Psoriasis    Spinal stenosis of lumbar region

## 2017-09-18 NOTE — ASU DISCHARGE PLAN (ADULT/PEDIATRIC). - SPECIAL INSTRUCTIONS
1. Please rest at home for the first 24 hours and do not drive, make any important decisions, or operate hazardous machinery.  2. No alcohol ingestion for the first 24 hours post-operatively.  3. No bending, no lifting for 7 days. For patients who had tear duct surgery, do not blow nose for 7 days.  4. It is recommended that you shower and get your face and stitches wet.  5. Normal diet.  6. Tylenol may be used for discomfort- 500 milligrams every 6 hours only if needed.  7. Expect increased black and blue and slight bloody ooze for 24-48 hours. Please apply cool compresses to the operative site for the first 24 hours. Ten minutes on and 10 minutes off while awake should suffice.  8. Resume all pre-operative medications today. You should resume aspirin, plavix, and/or coumadin or eliquis  today if you routinely take such.  9. Apply antibiotic ointment to the incision line at sleep.  10. Please call the office today for an appointment in 1 week.  11. Any questions, please call the office. In case of emergency, please contact the office  first. If you are unable to do so, call 911 and/or proceed to the nearest emergency room.

## 2018-01-23 ENCOUNTER — APPOINTMENT (OUTPATIENT)
Dept: NEPHROLOGY | Facility: CLINIC | Age: 83
End: 2018-01-23
Payer: MEDICARE

## 2018-01-23 VITALS
HEIGHT: 71 IN | HEART RATE: 73 BPM | DIASTOLIC BLOOD PRESSURE: 88 MMHG | SYSTOLIC BLOOD PRESSURE: 159 MMHG | BODY MASS INDEX: 29.63 KG/M2 | WEIGHT: 211.64 LBS | OXYGEN SATURATION: 97 %

## 2018-01-23 PROCEDURE — 36415 COLL VENOUS BLD VENIPUNCTURE: CPT

## 2018-01-23 PROCEDURE — 99214 OFFICE O/P EST MOD 30 MIN: CPT | Mod: 25

## 2018-01-25 LAB
25(OH)D3 SERPL-MCNC: 55.8 NG/ML
ALBUMIN SERPL ELPH-MCNC: 4.4 G/DL
ALP BLD-CCNC: 66 U/L
ALT SERPL-CCNC: 13 U/L
ANION GAP SERPL CALC-SCNC: 16 MMOL/L
AST SERPL-CCNC: 15 U/L
BASOPHILS # BLD AUTO: 0.04 K/UL
BASOPHILS NFR BLD AUTO: 0.5 %
BILIRUB SERPL-MCNC: 0.6 MG/DL
BUN SERPL-MCNC: 43 MG/DL
CALCIUM SERPL-MCNC: 10 MG/DL
CALCIUM SERPL-MCNC: 10 MG/DL
CHLORIDE SERPL-SCNC: 103 MMOL/L
CHOLEST SERPL-MCNC: 205 MG/DL
CHOLEST/HDLC SERPL: 4 RATIO
CO2 SERPL-SCNC: 24 MMOL/L
CREAT SERPL-MCNC: 2.89 MG/DL
CREAT SPEC-SCNC: 121 MG/DL
CREAT/PROT UR: 0.1 RATIO
EOSINOPHIL # BLD AUTO: 0.22 K/UL
EOSINOPHIL NFR BLD AUTO: 2.8 %
FERRITIN SERPL-MCNC: 222 NG/ML
GLUCOSE SERPL-MCNC: 101 MG/DL
HBA1C MFR BLD HPLC: 5.4 %
HCT VFR BLD CALC: 46.2 %
HDLC SERPL-MCNC: 51 MG/DL
HGB BLD-MCNC: 15 G/DL
IMM GRANULOCYTES NFR BLD AUTO: 0.3 %
IRON SATN MFR SERPL: 29 %
IRON SERPL-MCNC: 81 UG/DL
LDLC SERPL CALC-MCNC: 138 MG/DL
LYMPHOCYTES # BLD AUTO: 1.52 K/UL
LYMPHOCYTES NFR BLD AUTO: 19.3 %
MAN DIFF?: NORMAL
MCHC RBC-ENTMCNC: 29.4 PG
MCHC RBC-ENTMCNC: 32.5 GM/DL
MCV RBC AUTO: 90.6 FL
MONOCYTES # BLD AUTO: 0.9 K/UL
MONOCYTES NFR BLD AUTO: 11.5 %
NEUTROPHILS # BLD AUTO: 5.16 K/UL
NEUTROPHILS NFR BLD AUTO: 65.6 %
PARATHYROID HORMONE INTACT: 90 PG/ML
PHOSPHATE SERPL-MCNC: 2.9 MG/DL
PLATELET # BLD AUTO: 188 K/UL
POTASSIUM SERPL-SCNC: 5 MMOL/L
PROT SERPL-MCNC: 7.6 G/DL
PROT UR-MCNC: 10 MG/DL
RBC # BLD: 5.1 M/UL
RBC # FLD: 15 %
SODIUM SERPL-SCNC: 143 MMOL/L
TIBC SERPL-MCNC: 279 UG/DL
TRIGL SERPL-MCNC: 78 MG/DL
TSH SERPL-ACNC: 1.8 UIU/ML
UIBC SERPL-MCNC: 198 UG/DL
URATE SERPL-MCNC: 8 MG/DL
WBC # FLD AUTO: 7.86 K/UL

## 2018-06-08 ENCOUNTER — APPOINTMENT (OUTPATIENT)
Dept: NEPHROLOGY | Facility: CLINIC | Age: 83
End: 2018-06-08
Payer: MEDICARE

## 2018-06-08 VITALS
SYSTOLIC BLOOD PRESSURE: 164 MMHG | HEIGHT: 71 IN | HEART RATE: 82 BPM | OXYGEN SATURATION: 98 % | BODY MASS INDEX: 29.48 KG/M2 | WEIGHT: 210.54 LBS | DIASTOLIC BLOOD PRESSURE: 96 MMHG

## 2018-06-08 DIAGNOSIS — Z86.79 PERSONAL HISTORY OF OTHER DISEASES OF THE CIRCULATORY SYSTEM: ICD-10-CM

## 2018-06-08 PROCEDURE — 99214 OFFICE O/P EST MOD 30 MIN: CPT | Mod: 25

## 2018-06-08 PROCEDURE — 36415 COLL VENOUS BLD VENIPUNCTURE: CPT

## 2018-06-08 RX ORDER — TRIAMCINOLONE ACETONIDE 1 MG/G
0.1 OINTMENT TOPICAL
Qty: 454 | Refills: 0 | Status: ACTIVE | COMMUNITY
Start: 2018-04-03

## 2018-06-12 LAB
25(OH)D3 SERPL-MCNC: 57.2 NG/ML
ALBUMIN SERPL ELPH-MCNC: 4.6 G/DL
ALP BLD-CCNC: 63 U/L
ALT SERPL-CCNC: 17 U/L
ANION GAP SERPL CALC-SCNC: 15 MMOL/L
APPEARANCE: CLEAR
AST SERPL-CCNC: 13 U/L
BACTERIA: NEGATIVE
BASOPHILS # BLD AUTO: 0.02 K/UL
BASOPHILS NFR BLD AUTO: 0.2 %
BILIRUB SERPL-MCNC: 0.8 MG/DL
BILIRUBIN URINE: NEGATIVE
BLOOD URINE: NEGATIVE
BUN SERPL-MCNC: 43 MG/DL
CALCIUM SERPL-MCNC: 10.2 MG/DL
CALCIUM SERPL-MCNC: 10.2 MG/DL
CHLORIDE SERPL-SCNC: 105 MMOL/L
CHOLEST SERPL-MCNC: 175 MG/DL
CHOLEST/HDLC SERPL: 3.3 RATIO
CO2 SERPL-SCNC: 22 MMOL/L
COLOR: YELLOW
CREAT SERPL-MCNC: 2.63 MG/DL
CREAT SPEC-SCNC: 102 MG/DL
EOSINOPHIL # BLD AUTO: 0.11 K/UL
EOSINOPHIL NFR BLD AUTO: 1.3 %
FERRITIN SERPL-MCNC: 180 NG/ML
GLUCOSE QUALITATIVE U: NEGATIVE MG/DL
GLUCOSE SERPL-MCNC: 103 MG/DL
HBA1C MFR BLD HPLC: 5.5 %
HCT VFR BLD CALC: 48 %
HDLC SERPL-MCNC: 53 MG/DL
HGB BLD-MCNC: 15.8 G/DL
IMM GRANULOCYTES NFR BLD AUTO: 0.1 %
IRON SATN MFR SERPL: 18 %
IRON SERPL-MCNC: 50 UG/DL
KETONES URINE: NEGATIVE
LDLC SERPL CALC-MCNC: 109 MG/DL
LEUKOCYTE ESTERASE URINE: NEGATIVE
LYMPHOCYTES # BLD AUTO: 1.38 K/UL
LYMPHOCYTES NFR BLD AUTO: 16.1 %
MAN DIFF?: NORMAL
MCHC RBC-ENTMCNC: 29.3 PG
MCHC RBC-ENTMCNC: 32.9 GM/DL
MCV RBC AUTO: 89.1 FL
MICROALBUMIN 24H UR DL<=1MG/L-MCNC: 2.8 MG/DL
MICROALBUMIN/CREAT 24H UR-RTO: 27 MG/G
MICROSCOPIC-UA: NORMAL
MONOCYTES # BLD AUTO: 0.8 K/UL
MONOCYTES NFR BLD AUTO: 9.3 %
NEUTROPHILS # BLD AUTO: 6.25 K/UL
NEUTROPHILS NFR BLD AUTO: 73 %
NITRITE URINE: NEGATIVE
PARATHYROID HORMONE INTACT: 99 PG/ML
PH URINE: 5.5
PHOSPHATE SERPL-MCNC: 2.5 MG/DL
PLATELET # BLD AUTO: 181 K/UL
POTASSIUM SERPL-SCNC: 4.9 MMOL/L
PROT SERPL-MCNC: 8.4 G/DL
PROTEIN URINE: NEGATIVE MG/DL
RBC # BLD: 5.39 M/UL
RBC # FLD: 15.5 %
RED BLOOD CELLS URINE: 3 /HPF
SODIUM SERPL-SCNC: 142 MMOL/L
SPECIFIC GRAVITY URINE: 1.02
SQUAMOUS EPITHELIAL CELLS: 0 /HPF
TIBC SERPL-MCNC: 277 UG/DL
TRIGL SERPL-MCNC: 65 MG/DL
UIBC SERPL-MCNC: 227 UG/DL
URATE SERPL-MCNC: 8.6 MG/DL
UROBILINOGEN URINE: NEGATIVE MG/DL
WBC # FLD AUTO: 8.57 K/UL
WHITE BLOOD CELLS URINE: 0 /HPF

## 2018-08-01 PROBLEM — N18.9 CHRONIC KIDNEY DISEASE, UNSPECIFIED: Chronic | Status: ACTIVE | Noted: 2017-06-05

## 2018-08-08 ENCOUNTER — OUTPATIENT (OUTPATIENT)
Dept: OUTPATIENT SERVICES | Facility: HOSPITAL | Age: 83
LOS: 1 days | Discharge: HOME | End: 2018-08-08
Payer: MEDICARE

## 2018-08-08 DIAGNOSIS — I48.91 UNSPECIFIED ATRIAL FIBRILLATION: Chronic | ICD-10-CM

## 2018-08-08 PROCEDURE — 92960 CARDIOVERSION ELECTRIC EXT: CPT

## 2018-08-08 NOTE — PROGRESS NOTE ADULT - SUBJECTIVE AND OBJECTIVE BOX
Cardiac Electrophysiology Admission Note    History of Present Illness:  82 year old male with CKD, HLD, HTN, HOCM s/p alcohol septal ablation in [place] in 2003, + ICD ([employer]) with generator change in 12/2017, and atrial fibrillation on eliquis who presents for elective DCCV of aflutter.  He had dccv in July 2017 and was noted to be back in AFIB recently.  He feels some fatigue but no dizziness/CP/SOB/CASAS or palpitations.  Compliant with Eliquis 2.5 mg BID without interruption. No bleeding issue; some skin bruising while on eliquis. On Amiodarone as well.     ROS: except for right ankle slight edema and easy skin bruising, all other system negative.   Past Medical History:  as above   Past Surgical History:  as above + inguinal hernia repair   Family History: denies cardiac issue in family. + cancer in family   Social History: Quit TOB 40 years ago. Denies illicit drugs.  Social ETOH (0-5 drinks a week)  Allergies: NKDA  Medications: Amiodarone 200 mg daily, Eliquis 2.5mg BID, Cardizem 180 daily, pravastatin 10 mg daily, singlular 10 mg daily, flonase, vitamin D, clonidine 2.5 mg BID      Physical:   HR: 77 bpm		BP: 115/66	RR 16.  Afebrile  GEN: NAD  HEENT: no JVD  CARDS: Irregularly Irregular.  ICD left upper chest. no signs of skin erosion.   LUNGS: CTAB no w/r/r  EXT: Right ankle slight pitting edema  NEURO: no deficit noted    EKG: AFlutter VR 77 bpm.  RBBB.  Occasional .     A/P: 83 y/o M with CKD, HLD, HTN, HOCM with ICD, and atrial fibrillation and aflutter on uninterrupted eliquis.  - NPO, awaiting for DCCV.   - Continue home meds after dccv.   - d/c home. f/u with Dr. Fraser

## 2018-09-21 ENCOUNTER — APPOINTMENT (OUTPATIENT)
Dept: NEPHROLOGY | Facility: CLINIC | Age: 83
End: 2018-09-21
Payer: MEDICARE

## 2018-09-21 VITALS
HEIGHT: 71 IN | DIASTOLIC BLOOD PRESSURE: 90 MMHG | OXYGEN SATURATION: 97 % | RESPIRATION RATE: 15 BRPM | BODY MASS INDEX: 28.28 KG/M2 | SYSTOLIC BLOOD PRESSURE: 162 MMHG | WEIGHT: 202 LBS | HEART RATE: 72 BPM

## 2018-09-21 PROCEDURE — 99214 OFFICE O/P EST MOD 30 MIN: CPT

## 2018-09-21 RX ORDER — AMIODARONE HYDROCHLORIDE 100 MG/1
100 TABLET ORAL
Refills: 0 | Status: DISCONTINUED | COMMUNITY
End: 2018-09-21

## 2018-12-20 ENCOUNTER — APPOINTMENT (OUTPATIENT)
Dept: NEPHROLOGY | Facility: CLINIC | Age: 83
End: 2018-12-20
Payer: MEDICARE

## 2018-12-20 VITALS
SYSTOLIC BLOOD PRESSURE: 138 MMHG | OXYGEN SATURATION: 98 % | WEIGHT: 210.54 LBS | BODY MASS INDEX: 29.48 KG/M2 | HEART RATE: 72 BPM | DIASTOLIC BLOOD PRESSURE: 82 MMHG | HEIGHT: 71 IN

## 2018-12-20 PROCEDURE — 99214 OFFICE O/P EST MOD 30 MIN: CPT

## 2018-12-20 RX ORDER — CLONIDINE HYDROCHLORIDE 0.1 MG/1
0.1 TABLET ORAL TWICE DAILY
Refills: 0 | Status: DISCONTINUED | COMMUNITY
Start: 2017-09-15 | End: 2018-12-20

## 2018-12-20 RX ORDER — APREMILAST 30 MG/1
30 TABLET, FILM COATED ORAL
Refills: 0 | Status: DISCONTINUED | COMMUNITY
End: 2018-12-20

## 2019-04-29 ENCOUNTER — TRANSCRIPTION ENCOUNTER (OUTPATIENT)
Age: 84
End: 2019-04-29

## 2019-05-02 ENCOUNTER — APPOINTMENT (OUTPATIENT)
Dept: NEPHROLOGY | Facility: CLINIC | Age: 84
End: 2019-05-02
Payer: MEDICARE

## 2019-05-02 VITALS
SYSTOLIC BLOOD PRESSURE: 136 MMHG | WEIGHT: 214.73 LBS | DIASTOLIC BLOOD PRESSURE: 81 MMHG | HEIGHT: 71 IN | HEART RATE: 67 BPM | BODY MASS INDEX: 30.06 KG/M2 | OXYGEN SATURATION: 98 %

## 2019-05-02 PROCEDURE — 99214 OFFICE O/P EST MOD 30 MIN: CPT

## 2019-05-02 RX ORDER — METOPROLOL SUCCINATE 100 MG/1
100 TABLET, EXTENDED RELEASE ORAL DAILY
Refills: 0 | Status: ACTIVE | COMMUNITY

## 2019-05-02 RX ORDER — CHROMIUM 200 MCG
1000 TABLET ORAL DAILY
Refills: 0 | Status: DISCONTINUED | COMMUNITY
Start: 2017-09-15 | End: 2019-05-02

## 2019-05-02 RX ORDER — DOXYCYCLINE HYCLATE 50 MG/1
50 CAPSULE ORAL
Refills: 0 | Status: DISCONTINUED | COMMUNITY
End: 2019-05-02

## 2019-05-02 RX ORDER — PRAVASTATIN SODIUM 10 MG/1
10 TABLET ORAL
Refills: 0 | Status: DISCONTINUED | COMMUNITY
End: 2019-05-02

## 2019-05-02 RX ORDER — DILTIAZEM HYDROCHLORIDE 180 MG/1
180 CAPSULE, EXTENDED RELEASE ORAL
Refills: 0 | Status: DISCONTINUED | COMMUNITY
End: 2019-05-02

## 2019-05-03 NOTE — ASSESSMENT
[FreeTextEntry1] : 82 yo male with HTN, CKD4, ROSEANNE, rapid afib off amio with history of psoriasis versus eczema by dermatology\par CKD4: His renal function is reportedly stable\par K normal\par Afib: variable but currently sinus today.\par Edema: na restriction and continue with lasix 40\par HTN: BP more stable\par He is to avoid all NSAIDs\par We will get recent labs from PMD.\par Followup in 4 months

## 2019-05-03 NOTE — PHYSICAL EXAM
[General Appearance - Alert] : alert [General Appearance - In No Acute Distress] : in no acute distress [Sclera] : the sclera and conjunctiva were normal [Neck Appearance] : the appearance of the neck was normal [] : no respiratory distress [Auscultation Breath Sounds / Voice Sounds] : lungs were clear to auscultation bilaterally [Heart Rate And Rhythm] : heart rate was normal and rhythm regular [Heart Sounds Pericardial Friction Rub] : no pericardial rub [Heart Sounds] : normal S1 and S2 [Bowel Sounds] : normal bowel sounds [Abdomen Soft] : soft [No CVA Tenderness] : no ~M costovertebral angle tenderness [FreeTextEntry1] : Skin erythema–chronic [No Focal Deficits] : no focal deficits [Oriented To Time, Place, And Person] : oriented to person, place, and time [Affect] : the affect was normal [Mood] : the mood was normal

## 2019-05-03 NOTE — REVIEW OF SYSTEMS
[Lower Ext Edema] : lower extremity edema [Skin Lesions] : skin lesion [Negative] : Heme/Lymph [FreeTextEntry5] : Stable [de-identified] : Dermatitis

## 2019-05-03 NOTE — HISTORY OF PRESENT ILLNESS
[FreeTextEntry1] : 84 y/o male with history of AFIB, HTN, CKD4 baseline Scr ~2.5 here for followup\par He follows with Dr. Magana cardiologist in [facility] \par Renal sono showed echogenic kidneys bilaterally with bilateral cysts but no hydro, masses, stones\par Denies NSAID use\par \par Since last visit he continues to have dermatologic issues.  He was on otezla which was discontinued and  another dermatologist evaluation diagnosed him with eczema.  He is on a new medication called Dupixent, his skin erythema still prominent from the neck down however improved\par He had labs done with , results of which we will obtain.

## 2019-05-23 ENCOUNTER — OUTPATIENT (OUTPATIENT)
Dept: OUTPATIENT SERVICES | Facility: HOSPITAL | Age: 84
LOS: 1 days | Discharge: HOME | End: 2019-05-23
Payer: MEDICARE

## 2019-05-23 DIAGNOSIS — I48.91 UNSPECIFIED ATRIAL FIBRILLATION: Chronic | ICD-10-CM

## 2019-05-23 PROCEDURE — 93641 EP EVL 1/2CHMB PAC CVDFB TST: CPT

## 2019-05-23 PROCEDURE — 33263 RMVL & RPLCMT DFB GEN 2 LEAD: CPT

## 2019-05-23 PROCEDURE — C1722: CPT

## 2019-05-23 RX ORDER — CEFAZOLIN SODIUM 1 G
2000 VIAL (EA) INJECTION ONCE
Refills: 0 | Status: DISCONTINUED | OUTPATIENT
Start: 2019-05-23 | End: 2019-05-23

## 2019-05-23 RX ORDER — CEFAZOLIN SODIUM 1 G
2000 VIAL (EA) INJECTION ONCE
Refills: 0 | Status: COMPLETED | OUTPATIENT
Start: 2019-05-23 | End: 2019-05-23

## 2019-05-23 RX ADMIN — Medication 2000 MILLIGRAM(S): at 08:44

## 2019-05-23 NOTE — PROGRESS NOTE ADULT - SUBJECTIVE AND OBJECTIVE BOX
Cardiac Electrophysiology Admission Note    History of Present Illness:  Mr. 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    Since pacemaker implant no syncope.  Overall he feels well no fever, chills, near syncope, SOB, palpitations chest pain. Last dose of eliquis 5/21  Past Medical History:  as above  Past Surgical History:  as above + hernia, surgery for R iliac aneurysm  Family History: Non-contributory  Social History: denies smoking, drugs, alcohol  Allergies: NKDA  Medications:  Eliquis, Metoprolol, Dupixent  Physical:   HR: 95		BP: 123/78		O2 Sat 100%  	Temp: afebrile  GEN: NAD  HEENT: no JVD  CARDS: irregularly irregular  LUNGS: CTAB no w/r/r  EXT: no edema  NEURO: no deficit noted    EKG: afib RV pacing    A/P:  Mr. 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  NPO and consented.  Will d/w MD when to restart eliquis

## 2019-10-01 ENCOUNTER — LABORATORY RESULT (OUTPATIENT)
Age: 84
End: 2019-10-01

## 2019-10-01 ENCOUNTER — APPOINTMENT (OUTPATIENT)
Dept: NEPHROLOGY | Facility: CLINIC | Age: 84
End: 2019-10-01
Payer: MEDICARE

## 2019-10-01 VITALS
WEIGHT: 213.84 LBS | SYSTOLIC BLOOD PRESSURE: 123 MMHG | DIASTOLIC BLOOD PRESSURE: 69 MMHG | HEART RATE: 74 BPM | HEIGHT: 71 IN | BODY MASS INDEX: 29.94 KG/M2 | OXYGEN SATURATION: 98 %

## 2019-10-01 DIAGNOSIS — Z87.440 PERSONAL HISTORY OF URINARY (TRACT) INFECTIONS: ICD-10-CM

## 2019-10-01 DIAGNOSIS — N17.9 ACUTE KIDNEY FAILURE, UNSPECIFIED: ICD-10-CM

## 2019-10-01 PROCEDURE — 36415 COLL VENOUS BLD VENIPUNCTURE: CPT

## 2019-10-01 PROCEDURE — 99214 OFFICE O/P EST MOD 30 MIN: CPT | Mod: 25

## 2019-10-01 RX ORDER — UREA 17 G/85G
20 CREAM TOPICAL
Refills: 0 | Status: ACTIVE | COMMUNITY

## 2019-10-01 RX ORDER — CRISABOROLE 20 MG/G
2 OINTMENT TOPICAL
Refills: 0 | Status: ACTIVE | COMMUNITY

## 2019-10-01 NOTE — REVIEW OF SYSTEMS
[Lower Ext Edema] : lower extremity edema [Skin Lesions] : skin lesion [Negative] : Heme/Lymph [FreeTextEntry5] : Stable [de-identified] : Dermatitis

## 2019-10-01 NOTE — PHYSICAL EXAM
[General Appearance - Alert] : alert [General Appearance - In No Acute Distress] : in no acute distress [Sclera] : the sclera and conjunctiva were normal [Neck Appearance] : the appearance of the neck was normal [] : no respiratory distress [Auscultation Breath Sounds / Voice Sounds] : lungs were clear to auscultation bilaterally [Heart Rate And Rhythm] : heart rate was normal and rhythm regular [Heart Sounds] : normal S1 and S2 [Heart Sounds Pericardial Friction Rub] : no pericardial rub [Bowel Sounds] : normal bowel sounds [Abdomen Soft] : soft [FreeTextEntry1] : Skin erythema–chronic [No CVA Tenderness] : no ~M costovertebral angle tenderness [No Focal Deficits] : no focal deficits [Oriented To Time, Place, And Person] : oriented to person, place, and time [Affect] : the affect was normal [Mood] : the mood was normal

## 2019-10-01 NOTE — ASSESSMENT
[FreeTextEntry1] : 83 yo male with HTN, CKD4, ROSEANNE, rapid afib off amio with history of eczema by dermatology\par CKD4: His renal function has reportedly worsened on last labs but repeat today\par K normal\par Send off serologies for now ANCA and GBM, TIMI, dsDNA\par Afib: variable but currently sinus today.\par Edema: na restriction and continue with lasix 80 every other day\par HTN: BP more stable\par Derm followup for rash\par He is to avoid all NSAIDs\par Followup in 3 months

## 2019-10-01 NOTE — HISTORY OF PRESENT ILLNESS
[FreeTextEntry1] : 83 y/o male with history of AFIB, HTN, CKD4 baseline Scr ~2.5 here for followup\par He follows with Dr. Magana cardiologist in [facility] \par Renal sono showed echogenic kidneys bilaterally with bilateral cysts but no hydro, masses, stones\par Denies NSAID use\par \par Since last visit he had improvement then relapse of his dermatologic issues.  Another dermatologist evaluation diagnosed him with eczema.  He is on a new medication called Dupixent, his skin erythema improved and hair grew back but then rash worsened again.\par He also was diagnosed with ground glass on lung CT and was following with Dr Junito Barillas

## 2019-10-04 LAB
25(OH)D3 SERPL-MCNC: 52.4 NG/ML
ALBUMIN SERPL ELPH-MCNC: 4.2 G/DL
ALP BLD-CCNC: 77 U/L
ALT SERPL-CCNC: 10 U/L
ANA SER IF-ACNC: NEGATIVE
ANION GAP SERPL CALC-SCNC: 15 MMOL/L
APPEARANCE: ABNORMAL
AST SERPL-CCNC: 13 U/L
BACTERIA: ABNORMAL
BASOPHILS # BLD AUTO: 0.07 K/UL
BASOPHILS NFR BLD AUTO: 0.7 %
BILIRUB SERPL-MCNC: 0.5 MG/DL
BILIRUBIN URINE: NEGATIVE
BLOOD URINE: ABNORMAL
BUN SERPL-MCNC: 54 MG/DL
C3 SERPL-MCNC: 87 MG/DL
C4 SERPL-MCNC: 26 MG/DL
CALCIUM SERPL-MCNC: 9.8 MG/DL
CALCIUM SERPL-MCNC: 9.8 MG/DL
CHLORIDE SERPL-SCNC: 104 MMOL/L
CO2 SERPL-SCNC: 26 MMOL/L
COLOR: YELLOW
CREAT SERPL-MCNC: 3.75 MG/DL
CREAT SPEC-SCNC: 139 MG/DL
DEPRECATED KAPPA LC FREE/LAMBDA SER: 1.88 RATIO
DSDNA AB SER-ACNC: 36 IU/ML
EOSINOPHIL # BLD AUTO: 0.55 K/UL
EOSINOPHIL NFR BLD AUTO: 5.9 %
ESTIMATED AVERAGE GLUCOSE: 123 MG/DL
GLUCOSE QUALITATIVE U: NEGATIVE
GLUCOSE SERPL-MCNC: 111 MG/DL
HBA1C MFR BLD HPLC: 5.9 %
HCT VFR BLD CALC: 38.8 %
HGB BLD-MCNC: 11.5 G/DL
HYALINE CASTS: 0 /LPF
IMM GRANULOCYTES NFR BLD AUTO: 0.4 %
KAPPA LC CSF-MCNC: 5.74 MG/DL
KAPPA LC SERPL-MCNC: 10.77 MG/DL
KETONES URINE: NEGATIVE
LEUKOCYTE ESTERASE URINE: ABNORMAL
LYMPHOCYTES # BLD AUTO: 1.42 K/UL
LYMPHOCYTES NFR BLD AUTO: 15.2 %
M PROTEIN SPEC IFE-MCNC: NORMAL
MAGNESIUM SERPL-MCNC: 2.2 MG/DL
MAN DIFF?: NORMAL
MCHC RBC-ENTMCNC: 25 PG
MCHC RBC-ENTMCNC: 29.6 GM/DL
MCV RBC AUTO: 84.3 FL
MICROALBUMIN 24H UR DL<=1MG/L-MCNC: 11.3 MG/DL
MICROALBUMIN/CREAT 24H UR-RTO: 82 MG/G
MICROSCOPIC-UA: NORMAL
MONOCYTES # BLD AUTO: 1.2 K/UL
MONOCYTES NFR BLD AUTO: 12.8 %
MPO AB + PR3 PNL SER: NORMAL
NEUTROPHILS # BLD AUTO: 6.08 K/UL
NEUTROPHILS NFR BLD AUTO: 65 %
NITRITE URINE: NEGATIVE
PARATHYROID HORMONE INTACT: 101 PG/ML
PH URINE: 6
PHOSPHATE SERPL-MCNC: 3.8 MG/DL
PLATELET # BLD AUTO: 240 K/UL
POTASSIUM SERPL-SCNC: 5.2 MMOL/L
PROT SERPL-MCNC: 7.3 G/DL
PROTEIN URINE: ABNORMAL
RBC # BLD: 4.6 M/UL
RBC # FLD: 16.9 %
RED BLOOD CELLS URINE: 6 /HPF
SODIUM SERPL-SCNC: 144 MMOL/L
SPECIFIC GRAVITY URINE: 1.02
SQUAMOUS EPITHELIAL CELLS: 0 /HPF
URATE SERPL-MCNC: 10.8 MG/DL
UROBILINOGEN URINE: NORMAL
WBC # FLD AUTO: 9.36 K/UL
WHITE BLOOD CELLS URINE: 131 /HPF

## 2019-10-06 LAB — GBM AB TITR SER IF: <1 AL

## 2019-10-07 PROBLEM — N17.9 AKI (ACUTE KIDNEY INJURY): Status: ACTIVE | Noted: 2017-06-21

## 2019-10-11 ENCOUNTER — LABORATORY RESULT (OUTPATIENT)
Age: 84
End: 2019-10-11

## 2019-10-15 ENCOUNTER — MEDICATION RENEWAL (OUTPATIENT)
Age: 84
End: 2019-10-15

## 2019-10-17 ENCOUNTER — MEDICATION RENEWAL (OUTPATIENT)
Age: 84
End: 2019-10-17

## 2019-10-17 DIAGNOSIS — Z87.440 PERSONAL HISTORY OF URINARY (TRACT) INFECTIONS: ICD-10-CM

## 2019-10-18 ENCOUNTER — LABORATORY RESULT (OUTPATIENT)
Age: 84
End: 2019-10-18

## 2019-10-24 LAB — BACTERIA BLD CULT: NORMAL

## 2019-10-31 ENCOUNTER — OTHER (OUTPATIENT)
Age: 84
End: 2019-10-31

## 2019-11-01 ENCOUNTER — APPOINTMENT (OUTPATIENT)
Dept: PULMONOLOGY | Facility: CLINIC | Age: 84
End: 2019-11-01

## 2020-01-07 ENCOUNTER — APPOINTMENT (OUTPATIENT)
Dept: NEPHROLOGY | Facility: CLINIC | Age: 85
End: 2020-01-07
Payer: MEDICARE

## 2020-01-07 VITALS
OXYGEN SATURATION: 99 % | BODY MASS INDEX: 30.13 KG/M2 | WEIGHT: 216.05 LBS | HEART RATE: 69 BPM | SYSTOLIC BLOOD PRESSURE: 131 MMHG | DIASTOLIC BLOOD PRESSURE: 77 MMHG

## 2020-01-07 PROCEDURE — 99214 OFFICE O/P EST MOD 30 MIN: CPT

## 2020-01-07 RX ORDER — PREDNISONE 50 MG/1
TABLET ORAL
Refills: 0 | Status: DISCONTINUED | COMMUNITY
End: 2020-01-07

## 2020-01-07 RX ORDER — PRAVASTATIN SODIUM 10 MG/1
10 TABLET ORAL
Refills: 0 | Status: ACTIVE | COMMUNITY

## 2020-01-07 NOTE — HISTORY OF PRESENT ILLNESS
[FreeTextEntry1] : 83 y/o male with history of AFIB, HTN, CKD4 baseline Scr ~2.5 here for followup\par He follows with Dr. Magana cardiologist in [facility] \par Renal sono showed echogenic kidneys bilaterally with bilateral cysts but no hydro, masses, stones\par Denies NSAID use\par \par Since last visit he had improvement from his dermatologic issues.  \par He is on Dupixent and Xeljanz, his skin erythema improved\par He also was diagnosed with ground glass on lung CT and was following with Dr Junito Barillas- repeat eval in progress. He feels more bloated in abd area\par He had self adjusted lasix and only taking 80mg every 3 days. More SOB on exertion

## 2020-01-07 NOTE — ASSESSMENT
[FreeTextEntry1] : 83 yo male with HTN, CKD4, ROSEANNE, rapid afib off amio with history of eczema by dermatology\par CKD4: His renal function appears to have stabilized\par K normal\par Afib: variable but currently sinus today.\par Edema: na restriction and restart with lasix 80 every other day alternating with 40mg. He has more wt gain likely from retention. Daily wts\par Followup with pulm for ground glass\par HTN: BP more stable\par Derm followup for rash on meds\par He is to avoid all NSAIDs\par Followup in 4 months

## 2020-01-07 NOTE — PHYSICAL EXAM
[General Appearance - In No Acute Distress] : in no acute distress [General Appearance - Alert] : alert [Sclera] : the sclera and conjunctiva were normal [Neck Appearance] : the appearance of the neck was normal [Auscultation Breath Sounds / Voice Sounds] : lungs were clear to auscultation bilaterally [] : no respiratory distress [Heart Rate And Rhythm] : heart rate was normal and rhythm regular [Heart Sounds] : normal S1 and S2 [Heart Sounds Pericardial Friction Rub] : no pericardial rub [Abdomen Soft] : soft [Bowel Sounds] : normal bowel sounds [FreeTextEntry1] : Skin erythema improved [No CVA Tenderness] : no ~M costovertebral angle tenderness [No Focal Deficits] : no focal deficits [Mood] : the mood was normal [Oriented To Time, Place, And Person] : oriented to person, place, and time [Affect] : the affect was normal

## 2020-01-07 NOTE — REVIEW OF SYSTEMS
[Lower Ext Edema] : lower extremity edema [de-identified] : Dermatitis improved [Negative] : Heme/Lymph [FreeTextEntry5] : Stable

## 2020-05-15 ENCOUNTER — APPOINTMENT (OUTPATIENT)
Dept: NEPHROLOGY | Facility: CLINIC | Age: 85
End: 2020-05-15
Payer: MEDICARE

## 2020-05-15 PROCEDURE — 99214 OFFICE O/P EST MOD 30 MIN: CPT | Mod: 95

## 2020-05-15 NOTE — REVIEW OF SYSTEMS
[Lower Ext Edema] : lower extremity edema [Negative] : Heme/Lymph [FreeTextEntry5] : Stable [de-identified] : Dermatitis improved

## 2020-05-15 NOTE — ASSESSMENT
[FreeTextEntry1] : 85 yo male with HTN, CKD4, ROSEANNE, rapid afib, with history of eczema by dermatology\par CKD4: His renal function appears to have stabilized and now at 3.1\par K normal\par Gout: try to increase allopurinol to 200mg to see if that can control his gout\par Afib: variable\par Edema: na restriction and continue with lasix 80 every other day alternating with 40mg. Appears to be doing well with stable wts\par Followup with pulm for ground glass\par HTN: BP more stable\par Derm followup for rash on meds which are working\par He is to avoid all NSAIDs\par Followup in 4 months

## 2020-05-15 NOTE — HISTORY OF PRESENT ILLNESS
[Home] : at home, [unfilled] , at the time of the visit. [Medical Office: ([facility])___] : at the medical office located in  [Patient] : the patient [FreeTextEntry1] : 83 y/o male with history of AFIB, HTN, CKD4 baseline Scr ~2.5-3 on a telehealth visit for followup\par He follows with Dr. Magana cardiologist in [facility] \par Renal sono showed echogenic kidneys bilaterally with bilateral cysts but no hydro, masses, stones\par Denies NSAID use\par \par Since last visit he has improvement from his dermatologic issues.  \par He is on Dupixent and Xeljanz, his skin erythema disappeared and back to normal skin\par He also was diagnosed with ground glass on lung CT and was following with Dr Junito Barillas.\par His weights improved and /70 and Wt 212\par He has gout once month despite the allopurinol 100mg- would like better relief

## 2020-11-08 NOTE — ASU PATIENT PROFILE, ADULT - CENTRAL VENOUS CATHETER
GASTROENTEROLOGY CONSULT NOTE  HPI:  Ms. Reilly is a 56 yo F with PMH of MS (dx age 37, bedbound, AOx3), intracranial bleed many years ago, HTN, presents after being found poorly responsive but breathing in her bed at home. She presents with her night HHA who has known her for 5 years. She says she arrived at 18:00 to begin her shift and the day HHA, who is new and does not know the patient very well, told her the patient had been "sleeping" for most of the day. The night HHA tried to rouse patient including applying ice to her face and when she did not rouse called 911. 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 HCP is her stepbrother Fransico Mckay [phone].    ED COURSE: Patient BIBEMS and had to be intubated on arrival as she was breathing at approx 5/min and not protecting airway. ED Vitals 97.9 (rectal), 92, 112/83, 6, 95% on 4L. NIHSS 30 on arrival, no new CVA or ICH on CTH/CTA/CTP. VBG initially pH 7.45 but repeat 7.26, pCO2 71, HCO3 32. Lactate 7.0. UA grossly positive for UTI. CT abd with high grade SBO and dilated loops of bowel. Received succinylcholine, etomidate, HCO3 x2 amps, CTX/Flagyl, NS 3 L, Narcan.  (08 Nov 2020 04:28)    Allergies    No Known Allergies    Intolerances      Home Medications:  acyclovir:  (08 Nov 2020 04:58)  baclofen:  (08 Nov 2020 04:58)  buPROPion:  (08 Nov 2020 04:58)  clonazePAM:  (08 Nov 2020 04:58)  Eliquis 2.5 mg oral tablet: 1 tab(s) orally 2 times a day (08 Nov 2020 04:58)  escitalopram:  (08 Nov 2020 04:58)  famotidine:  (08 Nov 2020 04:58)  Florastor:  (08 Nov 2020 04:58)  oxybutynin:  (08 Nov 2020 04:58)  pregabalin:  (08 Nov 2020 04:58)  Probiotic Formula:  (08 Nov 2020 04:58)  tamsulosin:  (08 Nov 2020 04:58)  traZODone:  (08 Nov 2020 04:58)  verapamil:  (08 Nov 2020 04:58)    MEDICATIONS:  MEDICATIONS  (STANDING):  cefTRIAXone   IVPB 2000 milliGRAM(s) IV Intermittent every 24 hours  chlorhexidine 0.12% Liquid 15 milliLiter(s) Oral Mucosa every 12 hours  chlorhexidine 2% Cloths 1 Application(s) Topical <User Schedule>  enoxaparin Injectable 40 milliGRAM(s) SubCutaneous every 24 hours  insulin lispro (ADMELOG) corrective regimen sliding scale   SubCutaneous every 6 hours  pantoprazole  Injectable 40 milliGRAM(s) IV Push every 24 hours    MEDICATIONS  (PRN):    PAST MEDICAL & SURGICAL HISTORY:  ICH (intracerebral hemorrhage)    MS (multiple sclerosis)    Status post craniectomy      FAMILY HISTORY:  No pertinent family history in first degree relatives      SOCIAL HISTORY:  [place]: [ ] Current, [ ] Former, [ ] Never; Pack Years:  Alcohol:  Illicit Drugs:    REVIEW OF SYSTEMS:  could not be obtained as pt was intubated    Vital Signs Last 24 Hrs  T(C): 37.5 (08 Nov 2020 13:13), Max: 38 (08 Nov 2020 00:25)  T(F): 99.5 (08 Nov 2020 13:13), Max: 100.4 (08 Nov 2020 00:25)  HR: 83 (08 Nov 2020 14:00) (61 - 98)  BP: 119/67 (08 Nov 2020 14:00) (94/53 - 186/81)  BP(mean): 86 (08 Nov 2020 14:00) (68 - 102)  RR: 13 (08 Nov 2020 14:00) (6 - 17)  SpO2: 97% (08 Nov 2020 14:00) (95% - 100%)    11-07 @ 07:01  -  11-08 @ 07:00  --------------------------------------------------------  IN: 460 mL / OUT: 1560 mL / NET: -1100 mL    11-08 @ 07:01  -  11-08 @ 14:16  --------------------------------------------------------  IN: 75.5 mL / OUT: 195 mL / NET: -119.5 mL      Mode: standby    PHYSICAL EXAM:    General: Well developed; well nourished; intubated  Eyes: Anicteric sclerae, moist conjunctivae  HENT: Moist mucous membranes  Neck: Trachea midline, supple  Lungs: on mech vent  Cardiovascular: RRR  Abdomen: Soft, non-tender mildly distended; No rebound or guarding  Extremities: Normal range of motion, No clubbing, cyanosis or edema  Neurological: Alert and awake  Skin: Warm and dry. No obvious rash    LABS:                        14.8   13.20 )-----------( 228      ( 08 Nov 2020 02:26 )             44.6     11-08    145  |  106  |  18  ----------------------------<  137<H>  3.7   |  26  |  0.64    Ca    8.1<L>      08 Nov 2020 02:26    TPro  5.7<L>  /  Alb  3.0<L>  /  TBili  0.3  /  DBili  x   /  AST  19  /  ALT  10  /  AlkPhos  81  11-07      Culture Results:   No growth at 12 hours (11-07 @ 23:52)  Culture Results:   No growth at 12 hours (11-07 @ 23:52)  Culture Results:   90,000 CFU/ml Gram Negative Rods  Identification and susceptibility to follow. (11-07 @ 21:58)    PT/INR - ( 08 Nov 2020 02:26 )   PT: 15.3 sec;   INR: 1.29          PTT - ( 08 Nov 2020 02:26 )  PTT:33.3 sec    Culture - Blood (collected 07 Nov 2020 23:52)  Source: .Blood Blood  Preliminary Report (08 Nov 2020 12:00):    No growth at 12 hours    Culture - Blood (collected 07 Nov 2020 23:52)  Source: .Blood Blood  Preliminary Report (08 Nov 2020 12:00):    No growth at 12 hours    Culture - Urine (collected 07 Nov 2020 21:58)  Source: .Urine Clean Catch (Midstream)  Preliminary Report (08 Nov 2020 12:01):    90,000 CFU/ml Gram Negative Rods    Identification and susceptibility to follow.      RADIOLOGY & ADDITIONAL STUDIES:     Reviewed no

## 2020-12-19 ENCOUNTER — RX RENEWAL (OUTPATIENT)
Age: 85
End: 2020-12-19

## 2020-12-21 PROBLEM — Z87.440 HISTORY OF URINARY TRACT INFECTION: Status: RESOLVED | Noted: 2019-10-17 | Resolved: 2020-12-21

## 2020-12-21 PROBLEM — Z87.440 HISTORY OF URINARY TRACT INFECTION: Status: RESOLVED | Noted: 2019-10-07 | Resolved: 2020-12-21

## 2022-02-18 ENCOUNTER — APPOINTMENT (OUTPATIENT)
Dept: NEPHROLOGY | Facility: CLINIC | Age: 87
End: 2022-02-18
Payer: MEDICARE

## 2022-02-18 VITALS
HEART RATE: 68 BPM | DIASTOLIC BLOOD PRESSURE: 70 MMHG | SYSTOLIC BLOOD PRESSURE: 116 MMHG | TEMPERATURE: 97.2 F | WEIGHT: 213 LBS | HEIGHT: 71 IN | BODY MASS INDEX: 29.82 KG/M2 | OXYGEN SATURATION: 98 %

## 2022-02-18 VITALS — SYSTOLIC BLOOD PRESSURE: 130 MMHG | DIASTOLIC BLOOD PRESSURE: 70 MMHG

## 2022-02-18 DIAGNOSIS — L30.9 DERMATITIS, UNSPECIFIED: ICD-10-CM

## 2022-02-18 DIAGNOSIS — Z71.89 OTHER SPECIFIED COUNSELING: ICD-10-CM

## 2022-02-18 PROCEDURE — 99215 OFFICE O/P EST HI 40 MIN: CPT

## 2022-02-18 RX ORDER — CEPHALEXIN 250 MG/1
250 TABLET ORAL TWICE DAILY
Qty: 14 | Refills: 1 | Status: DISCONTINUED | COMMUNITY
Start: 2019-10-17 | End: 2022-02-18

## 2022-02-18 RX ORDER — DENOSUMAB 60 MG/ML
INJECTION SUBCUTANEOUS
Refills: 0 | Status: ACTIVE | COMMUNITY

## 2022-02-18 NOTE — PHYSICAL EXAM
[General Appearance - Alert] : alert [General Appearance - In No Acute Distress] : in no acute distress [Sclera] : the sclera and conjunctiva were normal [Outer Ear] : the ears and nose were normal in appearance [Neck Appearance] : the appearance of the neck was normal [Auscultation Breath Sounds / Voice Sounds] : lungs were clear to auscultation bilaterally [Heart Sounds] : normal S1 and S2 [Heart Rate And Rhythm] : heart rate was normal and rhythm regular [Bowel Sounds] : normal bowel sounds [Abdomen Soft] : soft [No CVA Tenderness] : no ~M costovertebral angle tenderness [] : no rash [No Focal Deficits] : no focal deficits [Affect] : the affect was normal [Oriented To Time, Place, And Person] : oriented to person, place, and time [Mood] : the mood was normal [FreeTextEntry1] : 1+ edema right > left

## 2022-02-18 NOTE — ASSESSMENT
[FreeTextEntry1] : 83 yo male with HTN, CKD4, rapid afib,eczema by dermatology, cardiorenal syndrome\par Volume overload and edema\par Edema: na restriction and continue with lasix 120 every day for week and then go back to 80mg\par CKD4: His renal function appears close to baseline 3.1\par K normal\par Afib: rate controlled currently\par HTN: BP stable\par Derm followup for eczema on meds which are working\par He is to avoid all NSAIDs\par \par Discussed with pt about the heart and kidney relationship when it comes to volume overload\par Advised 1-1.5liter fluid restriction\par Low Na diet\par Daily wts - he was advised to contact office of progress next week\par Advanced care planning discussed with pt- he would want dialysis in future if he ever needed it. \par If he has persistent issues with volume we may need dialysis in future. Renal replacement and modalities discussed with pt- hemo vs PD\par Information was given to pt to read\par Eventually may need plan for access. \par Followup in 2 months

## 2022-02-18 NOTE — HISTORY OF PRESENT ILLNESS
[FreeTextEntry1] : 87 y/o male with history of AFIB, HTN, CKD4 baseline Scr ~3 here after a longer than usual followup. \par Last seen in May 2020. He had fallen and broken his right femur and had surgery at [facility]. Received rehab thereafter. \par Denies NSAID use\par He follows with Dr. Magana cardiologist in [facility] and had worsening sob and wt gain 6 lbs and was advised to increase lasix to 120mg daily which he started 2 days ago\par Labs were also done\par \par Prior renal sono showed echogenic kidneys bilaterally with bilateral cysts but no hydro, masses, stones\par He has stabilization from his dermatologic issues on Dupixent and Xeljanz, his skin erythema disappeared and back to normal skin

## 2022-02-18 NOTE — REVIEW OF SYSTEMS
[Recent Weight Gain (___ Lbs)] : recent [unfilled] ~Ulb weight gain [Lower Ext Edema] : lower extremity edema [Negative] : Heme/Lymph [FreeTextEntry5] : Stable [de-identified] : Dermatitis improved

## 2022-02-24 ENCOUNTER — NON-APPOINTMENT (OUTPATIENT)
Age: 87
End: 2022-02-24

## 2022-03-27 ENCOUNTER — RX RENEWAL (OUTPATIENT)
Age: 87
End: 2022-03-27

## 2022-05-03 ENCOUNTER — APPOINTMENT (OUTPATIENT)
Dept: NEPHROLOGY | Facility: CLINIC | Age: 87
End: 2022-05-03
Payer: MEDICARE

## 2022-05-03 VITALS
BODY MASS INDEX: 29.78 KG/M2 | WEIGHT: 212.74 LBS | HEART RATE: 71 BPM | TEMPERATURE: 97.3 F | OXYGEN SATURATION: 98 % | DIASTOLIC BLOOD PRESSURE: 75 MMHG | HEIGHT: 71 IN | SYSTOLIC BLOOD PRESSURE: 118 MMHG

## 2022-05-03 PROCEDURE — 99214 OFFICE O/P EST MOD 30 MIN: CPT

## 2022-05-04 LAB
APPEARANCE: CLEAR
BACTERIA: NEGATIVE
BILIRUBIN URINE: NEGATIVE
BLOOD URINE: NEGATIVE
COLOR: NORMAL
CREAT SPEC-SCNC: 54 MG/DL
GLUCOSE QUALITATIVE U: NEGATIVE
HYALINE CASTS: 0 /LPF
KETONES URINE: NEGATIVE
LEUKOCYTE ESTERASE URINE: NEGATIVE
MICROALBUMIN 24H UR DL<=1MG/L-MCNC: 1.8 MG/DL
MICROALBUMIN/CREAT 24H UR-RTO: 34 MG/G
MICROSCOPIC-UA: NORMAL
NITRITE URINE: NEGATIVE
PH URINE: 7
PROTEIN URINE: NEGATIVE
RED BLOOD CELLS URINE: 0 /HPF
SPECIFIC GRAVITY URINE: 1.01
SQUAMOUS EPITHELIAL CELLS: 0 /HPF
UROBILINOGEN URINE: NORMAL
WHITE BLOOD CELLS URINE: 0 /HPF

## 2022-05-04 NOTE — HISTORY OF PRESENT ILLNESS
[FreeTextEntry1] : 85 y/o male with history of AFIB, HTN, CKD4 baseline Scr ~3 here after a longer than usual followup. \par He had fallen and broken his right femur and had surgery at [facility]. Received rehab thereafter. \par Denies NSAID use\par He follows with Dr. Magana cardiologist in [facility] and had worsening sob in Feb and wt gain 6 lbs and was advised to increase lasix to 120mg alternation with 80mg\par Prior renal sono showed echogenic kidneys bilaterally with bilateral cysts but no hydro, masses, stones\par He feels fantastic and feels a light switch went on. He is no longer in Afib\par Tapering off xeljanz\par

## 2022-05-04 NOTE — ASSESSMENT
[FreeTextEntry1] : 87 yo male with HTN, CKD4, rapid afib,eczema by dermatology, cardiorenal syndrome\par Edema: overall improved.  Na restriction and continue with lasix 120 alternating wth 80mg\par CKD4: His renal function appears close to baseline 3-3.5\par K normal\par Afib: in sinus at present\par HTN: BP stable\par Derm followup for eczema on meds which are working\par He is to avoid all NSAIDs\par Discussed with pt about the heart and kidney relationship when it comes to volume overload\par Advised 1-1.5liter fluid restriction\par Low Na diet\par Daily wts\par Advanced care planning discussed with pt- he would want dialysis in future if he ever needed it. \par If he has persistent issues with volume we may need dialysis in future. Renal replacement and modalities discussed with pt- hemo vs PD\par Information was given to pt to read\par Eventually may need plan for access. \par Followup in 4 months\par Check ACR

## 2022-05-04 NOTE — PHYSICAL EXAM
[General Appearance - Alert] : alert [General Appearance - In No Acute Distress] : in no acute distress [Sclera] : the sclera and conjunctiva were normal [Outer Ear] : the ears and nose were normal in appearance [Neck Appearance] : the appearance of the neck was normal [Auscultation Breath Sounds / Voice Sounds] : lungs were clear to auscultation bilaterally [Heart Rate And Rhythm] : heart rate was normal and rhythm regular [Heart Sounds] : normal S1 and S2 [FreeTextEntry1] : trace edema right > left [Bowel Sounds] : normal bowel sounds [Abdomen Soft] : soft [No CVA Tenderness] : no ~M costovertebral angle tenderness [Involuntary Movements] : no involuntary movements were seen [] : no rash [No Focal Deficits] : no focal deficits [Oriented To Time, Place, And Person] : oriented to person, place, and time [Affect] : the affect was normal [Mood] : the mood was normal

## 2022-05-04 NOTE — REVIEW OF SYSTEMS
[Lower Ext Edema] : lower extremity edema [Negative] : Heme/Lymph [FreeTextEntry5] : improved [de-identified] : Dermatitis improved

## 2022-09-06 ENCOUNTER — APPOINTMENT (OUTPATIENT)
Dept: NEPHROLOGY | Facility: CLINIC | Age: 87
End: 2022-09-06

## 2022-09-06 VITALS
DIASTOLIC BLOOD PRESSURE: 69 MMHG | WEIGHT: 211.64 LBS | TEMPERATURE: 97.8 F | HEIGHT: 71 IN | OXYGEN SATURATION: 96 % | HEART RATE: 70 BPM | SYSTOLIC BLOOD PRESSURE: 119 MMHG | BODY MASS INDEX: 29.63 KG/M2

## 2022-09-06 PROCEDURE — 99214 OFFICE O/P EST MOD 30 MIN: CPT

## 2022-09-06 RX ORDER — TOFACITINIB 5 MG/1
5 TABLET, FILM COATED ORAL
Refills: 0 | Status: DISCONTINUED | COMMUNITY
End: 2022-09-06

## 2022-09-08 RX ORDER — FUROSEMIDE 80 MG/1
80 TABLET ORAL
Qty: 90 | Refills: 0 | Status: ACTIVE | COMMUNITY
Start: 2022-06-22

## 2022-09-08 RX ORDER — KETOCONAZOLE 20 MG/G
2 CREAM TOPICAL
Qty: 60 | Refills: 0 | Status: ACTIVE | COMMUNITY
Start: 2022-05-19

## 2022-09-08 RX ORDER — FUROSEMIDE 40 MG/1
40 TABLET ORAL DAILY
Refills: 0 | Status: DISCONTINUED | COMMUNITY
End: 2022-09-08

## 2022-09-08 RX ORDER — AMMONIUM LACTATE 12 %
12 CREAM (GRAM) TOPICAL
Qty: 385 | Refills: 0 | Status: ACTIVE | COMMUNITY
Start: 2022-05-19

## 2022-09-08 RX ORDER — ZOLPIDEM TARTRATE 10 MG/1
10 TABLET ORAL
Qty: 30 | Refills: 0 | Status: ACTIVE | COMMUNITY
Start: 2022-02-16

## 2022-09-08 RX ORDER — IPRATROPIUM BROMIDE 42 UG/1
0.06 SPRAY NASAL
Qty: 45 | Refills: 0 | Status: ACTIVE | COMMUNITY
Start: 2022-05-09

## 2022-09-08 RX ORDER — HYDROCORTISONE 25 MG/G
2.5 CREAM TOPICAL
Qty: 28 | Refills: 0 | Status: ACTIVE | COMMUNITY
Start: 2022-07-18

## 2022-09-08 NOTE — PHYSICAL EXAM
[General Appearance - Alert] : alert [General Appearance - In No Acute Distress] : in no acute distress [Sclera] : the sclera and conjunctiva were normal [Outer Ear] : the ears and nose were normal in appearance [Neck Appearance] : the appearance of the neck was normal [Auscultation Breath Sounds / Voice Sounds] : lungs were clear to auscultation bilaterally [Heart Rate And Rhythm] : heart rate was normal and rhythm regular [Heart Sounds] : normal S1 and S2 [Bowel Sounds] : normal bowel sounds [Abdomen Soft] : soft [No CVA Tenderness] : no ~M costovertebral angle tenderness [Involuntary Movements] : no involuntary movements were seen [] : no rash [No Focal Deficits] : no focal deficits [Oriented To Time, Place, And Person] : oriented to person, place, and time [Affect] : the affect was normal [Mood] : the mood was normal [FreeTextEntry1] : trace edema

## 2022-09-08 NOTE — REVIEW OF SYSTEMS
[Lower Ext Edema] : lower extremity edema [Negative] : Heme/Lymph [FreeTextEntry5] : improved [de-identified] : Dermatitis improved

## 2022-09-08 NOTE — ASSESSMENT
[FreeTextEntry1] : 86 yo male with HTN, CKD4, rapid afib,eczema by dermatology, cardiorenal syndrome\par Edema: overall improved.  Na restriction and continue with lasix 80mg\par CKD4: His renal function appears close to baseline 3.5\par K normal\par Afib: in sinus at present\par HTN: BP stable\par Derm followup for eczema\par He is to avoid all NSAIDs\par Low Na diet\par Daily wts\par Advanced care planning discussed with pt- he may opt out of dialysis in future if he ever needed it. \par Will continue to support his decision however may he decide. Renal replacement and modalities discussed with pt- hemo vs PD\par Does not want to pursue access at this time. \par Followup in 4 months\par Labs prior to visit

## 2022-09-08 NOTE — HISTORY OF PRESENT ILLNESS
[FreeTextEntry1] : 88 y/o male with history of AFIB, HTN, for CKD4 followup\par \par He follows with Dr. Magana cardiologist in [facility] now remains on lasix 80mg\par Prior renal sono showed echogenic kidneys bilaterally with bilateral cysts but no hydro, masses, stones\par He is doing well\par Off xeljanz \par

## 2023-01-10 ENCOUNTER — APPOINTMENT (OUTPATIENT)
Dept: NEPHROLOGY | Facility: CLINIC | Age: 88
End: 2023-01-10
Payer: MEDICARE

## 2023-01-10 VITALS
BODY MASS INDEX: 29.96 KG/M2 | HEIGHT: 71 IN | HEART RATE: 70 BPM | WEIGHT: 214 LBS | OXYGEN SATURATION: 99 % | SYSTOLIC BLOOD PRESSURE: 134 MMHG | DIASTOLIC BLOOD PRESSURE: 80 MMHG

## 2023-01-10 VITALS — SYSTOLIC BLOOD PRESSURE: 126 MMHG | DIASTOLIC BLOOD PRESSURE: 80 MMHG

## 2023-01-10 PROCEDURE — 99214 OFFICE O/P EST MOD 30 MIN: CPT

## 2023-01-10 RX ORDER — DUPILUMAB 300 MG/2ML
300 INJECTION, SOLUTION SUBCUTANEOUS
Refills: 0 | Status: ACTIVE | COMMUNITY

## 2023-01-10 NOTE — REVIEW OF SYSTEMS
[Lower Ext Edema] : lower extremity edema [Negative] : Heme/Lymph [FreeTextEntry5] : improved [de-identified] : Dermatitis improved

## 2023-01-10 NOTE — HISTORY OF PRESENT ILLNESS
[FreeTextEntry1] : 86 y/o male with history of AFIB, HTN, for CKD4 followup\par \par He follows with Dr. Colton Magana cardiologist in [facility] now remains on lasix 80mg\par EF 47% BP at his office 140/90 yesterday\par Prior renal sono showed echogenic kidneys bilaterally with bilateral cysts but no hydro, masses, stones\par He is doing well\par Off xeljanz on dupixent\par

## 2023-01-10 NOTE — ASSESSMENT
[FreeTextEntry1] : 88 yo male with HTN, CKD4, rapid afib,eczema by dermatology, cardiorenal syndrome\par Edema: overall improved.  Na restriction and continue with lasix 80mg\par CKD4: His renal function baseline 3.5 Will recheck labs today\par Afib: in sinus at present\par HTN: BP stable with mild orthostatic change. WIll not make any medication adjustment for now. Low Na diet. Daily wts\par Derm followup for eczema\par He is to avoid all NSAIDs\par Advanced care planning discussed with pt- he may opt out of dialysis in future if he ever needed it. \par Will continue to support his decision however may he decide. \par Pending labs will discuss modalities \par Followup in 4 months\par Labs today

## 2023-01-10 NOTE — PHYSICAL EXAM
[General Appearance - Alert] : alert [General Appearance - In No Acute Distress] : in no acute distress [Sclera] : the sclera and conjunctiva were normal [Outer Ear] : the ears and nose were normal in appearance [Neck Appearance] : the appearance of the neck was normal [Auscultation Breath Sounds / Voice Sounds] : lungs were clear to auscultation bilaterally [Heart Rate And Rhythm] : heart rate was normal and rhythm regular [Heart Sounds] : normal S1 and S2 [Bowel Sounds] : normal bowel sounds [Abdomen Soft] : soft [Involuntary Movements] : no involuntary movements were seen [] : no rash [No Focal Deficits] : no focal deficits [Oriented To Time, Place, And Person] : oriented to person, place, and time [Affect] : the affect was normal [Mood] : the mood was normal [FreeTextEntry1] : trace edema

## 2023-01-13 LAB
25(OH)D3 SERPL-MCNC: 56.1 NG/ML
ALBUMIN SERPL ELPH-MCNC: 4.9 G/DL
ALP BLD-CCNC: 81 U/L
ALT SERPL-CCNC: 12 U/L
ANION GAP SERPL CALC-SCNC: 17 MMOL/L
APPEARANCE: CLEAR
AST SERPL-CCNC: 14 U/L
BACTERIA: NEGATIVE
BASOPHILS # BLD AUTO: 0.08 K/UL
BASOPHILS NFR BLD AUTO: 0.7 %
BILIRUB SERPL-MCNC: 0.7 MG/DL
BILIRUBIN URINE: NEGATIVE
BLOOD URINE: NEGATIVE
BUN SERPL-MCNC: 64 MG/DL
CALCIUM SERPL-MCNC: 10.3 MG/DL
CALCIUM SERPL-MCNC: 10.3 MG/DL
CHLORIDE SERPL-SCNC: 101 MMOL/L
CHOLEST SERPL-MCNC: 160 MG/DL
CO2 SERPL-SCNC: 26 MMOL/L
COLOR: COLORLESS
CREAT SERPL-MCNC: 4.03 MG/DL
CREAT SPEC-SCNC: 39 MG/DL
EGFR: 14 ML/MIN/1.73M2
EOSINOPHIL # BLD AUTO: 0.37 K/UL
EOSINOPHIL NFR BLD AUTO: 3.4 %
ESTIMATED AVERAGE GLUCOSE: 123 MG/DL
GLUCOSE QUALITATIVE U: NEGATIVE
GLUCOSE SERPL-MCNC: 113 MG/DL
HBA1C MFR BLD HPLC: 5.9 %
HCT VFR BLD CALC: 44.1 %
HDLC SERPL-MCNC: 45 MG/DL
HGB BLD-MCNC: 13.3 G/DL
HYALINE CASTS: 1 /LPF
IMM GRANULOCYTES NFR BLD AUTO: 0.3 %
KETONES URINE: NEGATIVE
LDLC SERPL CALC-MCNC: 100 MG/DL
LEUKOCYTE ESTERASE URINE: NEGATIVE
LYMPHOCYTES # BLD AUTO: 1.92 K/UL
LYMPHOCYTES NFR BLD AUTO: 17.9 %
MAGNESIUM SERPL-MCNC: 2.6 MG/DL
MAN DIFF?: NORMAL
MCHC RBC-ENTMCNC: 26.4 PG
MCHC RBC-ENTMCNC: 30.2 GM/DL
MCV RBC AUTO: 87.5 FL
MICROALBUMIN 24H UR DL<=1MG/L-MCNC: <1.2 MG/DL
MICROALBUMIN/CREAT 24H UR-RTO: NORMAL MG/G
MICROSCOPIC-UA: NORMAL
MONOCYTES # BLD AUTO: 1.09 K/UL
MONOCYTES NFR BLD AUTO: 10.1 %
NEUTROPHILS # BLD AUTO: 7.25 K/UL
NEUTROPHILS NFR BLD AUTO: 67.6 %
NITRITE URINE: NEGATIVE
NONHDLC SERPL-MCNC: 115 MG/DL
PARATHYROID HORMONE INTACT: 199 PG/ML
PH URINE: 6.5
PHOSPHATE SERPL-MCNC: 4.9 MG/DL
PLATELET # BLD AUTO: 206 K/UL
POTASSIUM SERPL-SCNC: 4 MMOL/L
PROT SERPL-MCNC: 7.8 G/DL
PROTEIN URINE: NEGATIVE
RBC # BLD: 5.04 M/UL
RBC # FLD: 18 %
RED BLOOD CELLS URINE: 0 /HPF
SODIUM SERPL-SCNC: 143 MMOL/L
SPECIFIC GRAVITY URINE: 1.01
SQUAMOUS EPITHELIAL CELLS: 0 /HPF
TRIGL SERPL-MCNC: 77 MG/DL
TSH SERPL-ACNC: 1.9 UIU/ML
URATE SERPL-MCNC: 7.2 MG/DL
UROBILINOGEN URINE: NORMAL
WBC # FLD AUTO: 10.74 K/UL
WHITE BLOOD CELLS URINE: 0 /HPF

## 2023-02-08 NOTE — ASU PREOP CHECKLIST - BP NONINVASIVE SYSTOLIC (MM HG)
02/08/23 1103   Discharge Planning   Concerns to be Addressed no discharge needs identified;denies needs/concerns at this time   Living Arrangements   Is your private residence a single family home or apartment? Single family home   Number of Stairs, Within Home, Primary greater than 10 stairs   Stair Railings, Within Home, Primary railings safe and in good condition   Once home, are you able to live on one level? No   Which level? Main Level   Which rooms are not on the main level? Bedroom   Bathroom Shower/Tub Tub/Shower unit   Support System   Do you have someone available to stay with you one or two nights once you are home? Yes  (Tamika/arvind)   Medical Clearance   It is recommended that you call and check with any specialty providers before surgery to see if you need surgical clearance.  Do you see any specialty providers outside of your primary care provider? No   Blood   Known Bleeding Disorder or Coagulopathy No   Does the patient have any Baptism/cultural preferences related to blood products? No   Education   Has the patient scheduled or completed pre-op total joint education, either in class or online, in the last 12 months? Yes  (reschedule, will review information)   Patient attended total joint pre-op class/received pre-op teaching  online        162

## 2023-05-02 ENCOUNTER — APPOINTMENT (OUTPATIENT)
Dept: NEPHROLOGY | Facility: CLINIC | Age: 88
End: 2023-05-02
Payer: MEDICARE

## 2023-05-02 VITALS
BODY MASS INDEX: 30.24 KG/M2 | HEIGHT: 71 IN | SYSTOLIC BLOOD PRESSURE: 118 MMHG | HEART RATE: 70 BPM | DIASTOLIC BLOOD PRESSURE: 80 MMHG | WEIGHT: 216 LBS

## 2023-05-02 PROCEDURE — 99214 OFFICE O/P EST MOD 30 MIN: CPT

## 2023-05-02 NOTE — HISTORY OF PRESENT ILLNESS
[FreeTextEntry1] : 88 y/o male with history of AFIB, HTN, for CKD4 followup\par \par He follows with Dr. Colton Magana cardiologist in [facility] now remains on lasix 80mg\par Prior renal sono showed echogenic kidneys bilaterally with bilateral cysts but no hydro, masses, stones\par He is doing well\par Off xeljanz on dupixent\par Dr Plummer is his internist now\par

## 2023-05-02 NOTE — PHYSICAL EXAM
[General Appearance - Alert] : alert [General Appearance - In No Acute Distress] : in no acute distress [Sclera] : the sclera and conjunctiva were normal [Outer Ear] : the ears and nose were normal in appearance [Neck Appearance] : the appearance of the neck was normal [Auscultation Breath Sounds / Voice Sounds] : lungs were clear to auscultation bilaterally [Heart Rate And Rhythm] : heart rate was normal and rhythm regular [Heart Sounds] : normal S1 and S2 [FreeTextEntry1] : trace edema  [Bowel Sounds] : normal bowel sounds [Abdomen Soft] : soft [Involuntary Movements] : no involuntary movements were seen [] : no rash [No Focal Deficits] : no focal deficits [Oriented To Time, Place, And Person] : oriented to person, place, and time [Affect] : the affect was normal [Mood] : the mood was normal

## 2023-05-02 NOTE — ASSESSMENT
[FreeTextEntry1] : 86 yo male with HTN, CKD4, rapid afib,eczema by dermatology, cardiorenal syndrome\par Edema: overall improved.  Na restriction and continue with lasix 80mg\par CKD4: His renal function baseline 3.5 Recheck labs today\par Afib: in sinus at present\par HTN: BP stable \par Derm followup for eczema\par He is to avoid all NSAIDs\par Night chills: will check quant gold and cbc and tsh for any etiology for this\par Advanced care planning discussed with pt- he may opt out of dialysis in future if he ever needed it. \par Will continue to support his decision however may he decide. \par Labs today\par Followup in 4 months\par

## 2023-05-04 LAB
ALBUMIN SERPL ELPH-MCNC: 4.6 G/DL
ALP BLD-CCNC: 63 U/L
ALT SERPL-CCNC: 12 U/L
ANION GAP SERPL CALC-SCNC: 20 MMOL/L
APPEARANCE: CLEAR
AST SERPL-CCNC: 13 U/L
BACTERIA: NEGATIVE /HPF
BASOPHILS # BLD AUTO: 0.09 K/UL
BASOPHILS NFR BLD AUTO: 0.9 %
BILIRUB SERPL-MCNC: 0.6 MG/DL
BILIRUBIN URINE: NEGATIVE
BLOOD URINE: NEGATIVE
BUN SERPL-MCNC: 72 MG/DL
CALCIUM SERPL-MCNC: 10.3 MG/DL
CAST: 1 /LPF
CHLORIDE SERPL-SCNC: 99 MMOL/L
CHOLEST SERPL-MCNC: 155 MG/DL
CO2 SERPL-SCNC: 22 MMOL/L
COLOR: YELLOW
CREAT SERPL-MCNC: 3.87 MG/DL
CREAT SPEC-SCNC: 112 MG/DL
EGFR: 15 ML/MIN/1.73M2
EOSINOPHIL # BLD AUTO: 0.27 K/UL
EOSINOPHIL NFR BLD AUTO: 2.6 %
EPITHELIAL CELLS: 0 /HPF
ESTIMATED AVERAGE GLUCOSE: 128 MG/DL
GLUCOSE QUALITATIVE U: NEGATIVE MG/DL
GLUCOSE SERPL-MCNC: 109 MG/DL
HBA1C MFR BLD HPLC: 6.1 %
HCT VFR BLD CALC: 47.4 %
HDLC SERPL-MCNC: 40 MG/DL
HGB BLD-MCNC: 14.1 G/DL
IMM GRANULOCYTES NFR BLD AUTO: 0.3 %
IRON SATN MFR SERPL: 21 %
IRON SERPL-MCNC: 77 UG/DL
KETONES URINE: NEGATIVE MG/DL
LDLC SERPL CALC-MCNC: 95 MG/DL
LEUKOCYTE ESTERASE URINE: NEGATIVE
LYMPHOCYTES # BLD AUTO: 2.25 K/UL
LYMPHOCYTES NFR BLD AUTO: 21.8 %
M TB IFN-G BLD-IMP: NEGATIVE
MAGNESIUM SERPL-MCNC: 2.6 MG/DL
MAN DIFF?: NORMAL
MCHC RBC-ENTMCNC: 26.9 PG
MCHC RBC-ENTMCNC: 29.7 GM/DL
MCV RBC AUTO: 90.3 FL
MICROALBUMIN 24H UR DL<=1MG/L-MCNC: 2.6 MG/DL
MICROALBUMIN/CREAT 24H UR-RTO: 24 MG/G
MICROSCOPIC-UA: NORMAL
MONOCYTES # BLD AUTO: 1.29 K/UL
MONOCYTES NFR BLD AUTO: 12.5 %
NEUTROPHILS # BLD AUTO: 6.39 K/UL
NEUTROPHILS NFR BLD AUTO: 61.9 %
NITRITE URINE: NEGATIVE
NONHDLC SERPL-MCNC: 115 MG/DL
PH URINE: 6
PHOSPHATE SERPL-MCNC: 4.2 MG/DL
PLATELET # BLD AUTO: 210 K/UL
POTASSIUM SERPL-SCNC: 4.6 MMOL/L
PROT SERPL-MCNC: 7.7 G/DL
PROTEIN URINE: NORMAL MG/DL
QUANTIFERON TB PLUS MITOGEN MINUS NIL: 2.48 IU/ML
QUANTIFERON TB PLUS NIL: 0.02 IU/ML
QUANTIFERON TB PLUS TB1 MINUS NIL: 0 IU/ML
QUANTIFERON TB PLUS TB2 MINUS NIL: 0 IU/ML
RBC # BLD: 5.25 M/UL
RBC # FLD: 17.6 %
RED BLOOD CELLS URINE: 0 /HPF
SODIUM SERPL-SCNC: 141 MMOL/L
SPECIFIC GRAVITY URINE: 1.02
TIBC SERPL-MCNC: 371 UG/DL
TRIGL SERPL-MCNC: 101 MG/DL
TSH SERPL-ACNC: 1.34 UIU/ML
UIBC SERPL-MCNC: 294 UG/DL
URATE SERPL-MCNC: 8.3 MG/DL
UROBILINOGEN URINE: 0.2 MG/DL
WBC # FLD AUTO: 10.32 K/UL
WHITE BLOOD CELLS URINE: 0 /HPF

## 2023-05-05 LAB
25(OH)D3 SERPL-MCNC: 51.8 NG/ML
CALCIUM SERPL-MCNC: 10.3 MG/DL
FERRITIN SERPL-MCNC: 78 NG/ML
PARATHYROID HORMONE INTACT: 94 PG/ML

## 2023-05-08 ENCOUNTER — RX RENEWAL (OUTPATIENT)
Age: 88
End: 2023-05-08

## 2023-05-08 RX ORDER — ALLOPURINOL 100 MG/1
100 TABLET ORAL
Qty: 180 | Refills: 3 | Status: ACTIVE | COMMUNITY
Start: 2020-12-19 | End: 1900-01-01

## 2023-09-28 ENCOUNTER — APPOINTMENT (OUTPATIENT)
Dept: NEPHROLOGY | Facility: CLINIC | Age: 88
End: 2023-09-28
Payer: MEDICARE

## 2023-09-28 VITALS
HEIGHT: 71 IN | DIASTOLIC BLOOD PRESSURE: 80 MMHG | BODY MASS INDEX: 29.68 KG/M2 | SYSTOLIC BLOOD PRESSURE: 134 MMHG | HEART RATE: 70 BPM | WEIGHT: 212 LBS

## 2023-09-28 PROCEDURE — 99213 OFFICE O/P EST LOW 20 MIN: CPT
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
## 2024-01-18 ENCOUNTER — APPOINTMENT (OUTPATIENT)
Dept: NEPHROLOGY | Facility: CLINIC | Age: 89
End: 2024-01-18
Payer: MEDICARE

## 2024-01-18 VITALS
DIASTOLIC BLOOD PRESSURE: 74 MMHG | SYSTOLIC BLOOD PRESSURE: 147 MMHG | OXYGEN SATURATION: 96 % | WEIGHT: 208 LBS | HEART RATE: 70 BPM | TEMPERATURE: 97.5 F | BODY MASS INDEX: 29.78 KG/M2 | HEIGHT: 70 IN

## 2024-01-18 VITALS — HEART RATE: 70 BPM | DIASTOLIC BLOOD PRESSURE: 78 MMHG | SYSTOLIC BLOOD PRESSURE: 144 MMHG

## 2024-01-18 PROCEDURE — 99213 OFFICE O/P EST LOW 20 MIN: CPT

## 2024-01-18 NOTE — HISTORY OF PRESENT ILLNESS
[FreeTextEntry1] : 89 y/o male with history of AFIB, HTN, for CKD4 followup  He follows with Dr. Colton Magana cardiologist in [facility] now remains on lasix 80mg Prior renal sono showed echogenic kidneys bilaterally with bilateral cysts but no hydro, masses, stones He is doing well Remains on dupixent He is expecting his second grandchild today-

## 2024-01-18 NOTE — ASSESSMENT
[FreeTextEntry1] : 87 yo male with HTN, CKD4, rapid afib,eczema by dermatology, cardiorenal syndrome Edema: overall improved. Na restriction and continue with lasix 80mg. I advised pt to take extra lasix 80mg x1 as needed for increased wt gain or SOB or edema CKD4: His renal function baseline 3.5-4. Repeat today Afib: in sinus at present HTN: BP stable Derm followup for eczema He is to avoid all NSAIDs Advanced care planning discussed with pt- he opts out of dialysis in future if he ever needed it. Will continue to support his decision however may he decide. Followup in 4 months.

## 2024-01-19 DIAGNOSIS — E87.5 HYPERKALEMIA: ICD-10-CM

## 2024-01-19 LAB
25(OH)D3 SERPL-MCNC: 46.6 NG/ML
ALBUMIN SERPL ELPH-MCNC: 4.5 G/DL
ALP BLD-CCNC: 77 U/L
ALT SERPL-CCNC: 12 U/L
ANION GAP SERPL CALC-SCNC: 16 MMOL/L
APPEARANCE: CLEAR
AST SERPL-CCNC: 14 U/L
BACTERIA: NEGATIVE /HPF
BASOPHILS # BLD AUTO: 0.06 K/UL
BASOPHILS NFR BLD AUTO: 0.6 %
BILIRUB SERPL-MCNC: 0.5 MG/DL
BILIRUBIN URINE: NEGATIVE
BLOOD URINE: NEGATIVE
BUN SERPL-MCNC: 65 MG/DL
CALCIUM SERPL-MCNC: 9.5 MG/DL
CALCIUM SERPL-MCNC: 9.5 MG/DL
CAST: 1 /LPF
CHLORIDE SERPL-SCNC: 103 MMOL/L
CHOLEST SERPL-MCNC: 134 MG/DL
CO2 SERPL-SCNC: 21 MMOL/L
COLOR: YELLOW
CREAT SERPL-MCNC: 3.46 MG/DL
CREAT SPEC-SCNC: 106 MG/DL
EGFR: 17 ML/MIN/1.73M2
EOSINOPHIL # BLD AUTO: 0.3 K/UL
EOSINOPHIL NFR BLD AUTO: 3.2 %
EPITHELIAL CELLS: 0 /HPF
ESTIMATED AVERAGE GLUCOSE: 120 MG/DL
FERRITIN SERPL-MCNC: 125 NG/ML
FOLATE SERPL-MCNC: 6.3 NG/ML
GLUCOSE QUALITATIVE U: NEGATIVE MG/DL
GLUCOSE SERPL-MCNC: 88 MG/DL
HBA1C MFR BLD HPLC: 5.8 %
HCT VFR BLD CALC: 44.1 %
HDLC SERPL-MCNC: 38 MG/DL
HGB BLD-MCNC: 14.2 G/DL
IMM GRANULOCYTES NFR BLD AUTO: 0.4 %
IRON SATN MFR SERPL: 19 %
IRON SERPL-MCNC: 56 UG/DL
KETONES URINE: NEGATIVE MG/DL
LDLC SERPL CALC-MCNC: 85 MG/DL
LEUKOCYTE ESTERASE URINE: NEGATIVE
LYMPHOCYTES # BLD AUTO: 1.65 K/UL
LYMPHOCYTES NFR BLD AUTO: 17.8 %
MAGNESIUM SERPL-MCNC: 2.5 MG/DL
MAN DIFF?: NORMAL
MCHC RBC-ENTMCNC: 28.6 PG
MCHC RBC-ENTMCNC: 32.2 GM/DL
MCV RBC AUTO: 88.7 FL
MICROALBUMIN 24H UR DL<=1MG/L-MCNC: 6.7 MG/DL
MICROALBUMIN/CREAT 24H UR-RTO: 63 MG/G
MICROSCOPIC-UA: NORMAL
MONOCYTES # BLD AUTO: 1 K/UL
MONOCYTES NFR BLD AUTO: 10.8 %
NEUTROPHILS # BLD AUTO: 6.21 K/UL
NEUTROPHILS NFR BLD AUTO: 67.2 %
NITRITE URINE: NEGATIVE
NONHDLC SERPL-MCNC: 96 MG/DL
PARATHYROID HORMONE INTACT: 175 PG/ML
PH URINE: 6.5
PHOSPHATE SERPL-MCNC: 3.5 MG/DL
PLATELET # BLD AUTO: 205 K/UL
POTASSIUM SERPL-SCNC: 6 MMOL/L
PROT SERPL-MCNC: 7.4 G/DL
PROTEIN URINE: 30 MG/DL
RBC # BLD: 4.97 M/UL
RBC # FLD: 17.4 %
RED BLOOD CELLS URINE: 1 /HPF
SODIUM SERPL-SCNC: 140 MMOL/L
SPECIFIC GRAVITY URINE: 1.02
TIBC SERPL-MCNC: 297 UG/DL
TRIGL SERPL-MCNC: 48 MG/DL
UIBC SERPL-MCNC: 241 UG/DL
URATE SERPL-MCNC: 8.4 MG/DL
UROBILINOGEN URINE: 0.2 MG/DL
VIT B12 SERPL-MCNC: 890 PG/ML
WBC # FLD AUTO: 9.26 K/UL
WHITE BLOOD CELLS URINE: 0 /HPF

## 2024-01-26 ENCOUNTER — NON-APPOINTMENT (OUTPATIENT)
Age: 89
End: 2024-01-26

## 2024-01-26 LAB
ALBUMIN SERPL ELPH-MCNC: 4.5 G/DL
ANION GAP SERPL CALC-SCNC: 14 MMOL/L
BUN SERPL-MCNC: 68 MG/DL
CALCIUM SERPL-MCNC: 9.7 MG/DL
CHLORIDE SERPL-SCNC: 104 MMOL/L
CO2 SERPL-SCNC: 25 MMOL/L
CREAT SERPL-MCNC: 3.89 MG/DL
EGFR: 14 ML/MIN/1.73M2
GLUCOSE SERPL-MCNC: 106 MG/DL
PHOSPHATE SERPL-MCNC: 3.8 MG/DL
POTASSIUM SERPL-SCNC: 4.6 MMOL/L
SODIUM SERPL-SCNC: 143 MMOL/L

## 2024-05-04 NOTE — ED ADULT NURSE NOTE - RESPIRATORY ASSESSMENT
Chest pain in the setting of concurrent cocaine use  Currently denies any chest pain or palpitations   EKG: no ischemic changes   Initial troponin < 1   Continue to monitor    WDL

## 2024-05-21 ENCOUNTER — APPOINTMENT (OUTPATIENT)
Dept: NEPHROLOGY | Facility: CLINIC | Age: 89
End: 2024-05-21
Payer: MEDICARE

## 2024-05-21 VITALS
TEMPERATURE: 97.8 F | WEIGHT: 190 LBS | BODY MASS INDEX: 27.2 KG/M2 | HEIGHT: 70 IN | HEART RATE: 59 BPM | SYSTOLIC BLOOD PRESSURE: 157 MMHG | OXYGEN SATURATION: 97 % | DIASTOLIC BLOOD PRESSURE: 86 MMHG

## 2024-05-21 DIAGNOSIS — I10 ESSENTIAL (PRIMARY) HYPERTENSION: ICD-10-CM

## 2024-05-21 DIAGNOSIS — N18.4 CHRONIC KIDNEY DISEASE, STAGE 4 (SEVERE): ICD-10-CM

## 2024-05-21 DIAGNOSIS — I13.0 HYPERTENSIVE HEART AND CHRONIC KIDNEY DISEASE WITH HEART FAILURE AND STAGE 1 THROUGH STAGE 4 CHRONIC KIDNEY DISEASE, OR UNSPECIFIED CHRONIC KIDNEY DISEASE: ICD-10-CM

## 2024-05-21 PROCEDURE — 99213 OFFICE O/P EST LOW 20 MIN: CPT

## 2024-05-21 PROCEDURE — G2211 COMPLEX E/M VISIT ADD ON: CPT

## 2024-05-21 NOTE — HISTORY OF PRESENT ILLNESS
[FreeTextEntry1] : 87 y/o male with history of AFIB, HTN, for CKD4 followup  He follows with Dr. Colton Magana cardiologist in [facility] now remains on lasix 80mg Prior renal sono showed echogenic kidneys bilaterally with bilateral cysts but no hydro, masses, stones He is doing well now- he had incurred a fall onto his back several months ago. Amazingly that brought back to sinus and he feels so much better. No SOB He had been dealing with his wife diagnosis of ovarian cancer and surgery, rehab and falls Remains on dupixent She is now home and getting PT

## 2024-05-21 NOTE — ASSESSMENT
[FreeTextEntry1] : 87 yo male with HTN, CKD4, rapid afib,eczema by dermatology, cardiorenal syndrome Edema: overall improved. Na restriction and continue with lasix 80mg. I advised pt to take extra lasix 80mg x1 as needed for increased wt gain or SOB or edema CKD4: His renal function baseline 3.5-4. Repeat today Afib: in sinus at present HTN: BP stable Derm followup for eczema He is to avoid all NSAIDs Advanced care planning discussed with pt- he opts out of dialysis in future if he ever needed it.  Followup in 4 months.

## 2024-05-21 NOTE — PHYSICAL EXAM
[General Appearance - Alert] : alert [General Appearance - In No Acute Distress] : in no acute distress [Sclera] : the sclera and conjunctiva were normal [Outer Ear] : the ears and nose were normal in appearance [Neck Appearance] : the appearance of the neck was normal [Auscultation Breath Sounds / Voice Sounds] : lungs were clear to auscultation bilaterally [Heart Rate And Rhythm] : heart rate was normal and rhythm regular [Heart Sounds] : normal S1 and S2 [FreeTextEntry1] : 1+ edema  [Bowel Sounds] : normal bowel sounds [Abdomen Soft] : soft [Involuntary Movements] : no involuntary movements were seen [] : no rash [No Focal Deficits] : no focal deficits [Oriented To Time, Place, And Person] : oriented to person, place, and time [Affect] : the affect was normal [Mood] : the mood was normal

## 2024-05-22 LAB
25(OH)D3 SERPL-MCNC: 56.6 NG/ML
ALBUMIN SERPL ELPH-MCNC: 4.1 G/DL
ALP BLD-CCNC: 70 U/L
ALT SERPL-CCNC: 17 U/L
ANION GAP SERPL CALC-SCNC: 17 MMOL/L
APPEARANCE: CLEAR
AST SERPL-CCNC: 16 U/L
BACTERIA: NEGATIVE /HPF
BASOPHILS # BLD AUTO: 0.12 K/UL
BASOPHILS NFR BLD AUTO: 1 %
BILIRUB SERPL-MCNC: 0.5 MG/DL
BILIRUBIN URINE: NEGATIVE
BLOOD URINE: NEGATIVE
BUN SERPL-MCNC: 45 MG/DL
CALCIUM SERPL-MCNC: 8.7 MG/DL
CALCIUM SERPL-MCNC: 8.7 MG/DL
CAST: 1 /LPF
CHLORIDE SERPL-SCNC: 105 MMOL/L
CHOLEST SERPL-MCNC: 197 MG/DL
CO2 SERPL-SCNC: 22 MMOL/L
COLOR: YELLOW
CREAT SERPL-MCNC: 3.4 MG/DL
CREAT SPEC-SCNC: 59 MG/DL
EGFR: 17 ML/MIN/1.73M2
EOSINOPHIL # BLD AUTO: 2 K/UL
EOSINOPHIL NFR BLD AUTO: 17 %
EPITHELIAL CELLS: 0 /HPF
ESTIMATED AVERAGE GLUCOSE: 114 MG/DL
FERRITIN SERPL-MCNC: 242 NG/ML
GLUCOSE QUALITATIVE U: NEGATIVE MG/DL
GLUCOSE SERPL-MCNC: 98 MG/DL
HBA1C MFR BLD HPLC: 5.6 %
HCT VFR BLD CALC: 42.7 %
HDLC SERPL-MCNC: 48 MG/DL
HGB BLD-MCNC: 13.5 G/DL
IMM GRANULOCYTES NFR BLD AUTO: 0.3 %
IRON SATN MFR SERPL: 24 %
IRON SERPL-MCNC: 61 UG/DL
KETONES URINE: NEGATIVE MG/DL
LDLC SERPL CALC-MCNC: 136 MG/DL
LEUKOCYTE ESTERASE URINE: NEGATIVE
LYMPHOCYTES # BLD AUTO: 2.14 K/UL
LYMPHOCYTES NFR BLD AUTO: 18.2 %
MAGNESIUM SERPL-MCNC: 2.5 MG/DL
MAN DIFF?: NORMAL
MCHC RBC-ENTMCNC: 28.6 PG
MCHC RBC-ENTMCNC: 31.6 GM/DL
MCV RBC AUTO: 90.5 FL
MICROALBUMIN 24H UR DL<=1MG/L-MCNC: 1.2 MG/DL
MICROALBUMIN/CREAT 24H UR-RTO: 21 MG/G
MICROSCOPIC-UA: NORMAL
MONOCYTES # BLD AUTO: 0.96 K/UL
MONOCYTES NFR BLD AUTO: 8.2 %
NEUTROPHILS # BLD AUTO: 6.5 K/UL
NEUTROPHILS NFR BLD AUTO: 55.3 %
NITRITE URINE: NEGATIVE
NONHDLC SERPL-MCNC: 149 MG/DL
PARATHYROID HORMONE INTACT: 366 PG/ML
PH URINE: 6
PHOSPHATE SERPL-MCNC: 3.5 MG/DL
PLATELET # BLD AUTO: 213 K/UL
POTASSIUM SERPL-SCNC: 4.3 MMOL/L
PROT SERPL-MCNC: 7.2 G/DL
PROTEIN URINE: NEGATIVE MG/DL
RBC # BLD: 4.72 M/UL
RBC # FLD: 17.3 %
RED BLOOD CELLS URINE: 0 /HPF
SODIUM SERPL-SCNC: 143 MMOL/L
SPECIFIC GRAVITY URINE: 1.01
TIBC SERPL-MCNC: 252 UG/DL
TRIGL SERPL-MCNC: 75 MG/DL
TSH SERPL-ACNC: 2.24 UIU/ML
UIBC SERPL-MCNC: 191 UG/DL
URATE SERPL-MCNC: 7.8 MG/DL
UROBILINOGEN URINE: 0.2 MG/DL
WBC # FLD AUTO: 11.76 K/UL
WHITE BLOOD CELLS URINE: 0 /HPF

## 2024-08-15 ENCOUNTER — OUTPATIENT (OUTPATIENT)
Dept: OUTPATIENT SERVICES | Facility: HOSPITAL | Age: 89
LOS: 1 days | Discharge: HOME | End: 2024-08-15
Payer: MEDICARE

## 2024-08-15 VITALS — WEIGHT: 205.03 LBS

## 2024-08-15 DIAGNOSIS — I48.91 UNSPECIFIED ATRIAL FIBRILLATION: Chronic | ICD-10-CM

## 2024-08-15 LAB
ISTAT INR: 1.1 — SIGNIFICANT CHANGE UP (ref 0.88–1.16)
ISTAT PT: 11.1 SEC — SIGNIFICANT CHANGE UP (ref 10–12.9)
ISTAT VENOUS BE: 3 MMOL/L — SIGNIFICANT CHANGE UP (ref -2–3)
ISTAT VENOUS GLUCOSE: 101 MG/DL — HIGH (ref 70–99)
ISTAT VENOUS HCO3: 29 MMOL/L — HIGH (ref 23–28)
ISTAT VENOUS HEMATOCRIT: 42 % — SIGNIFICANT CHANGE UP (ref 39–50)
ISTAT VENOUS HEMOGLOBIN: 14.3 GM/DL — SIGNIFICANT CHANGE UP (ref 13–17)
ISTAT VENOUS IONIZED CALCIUM: 1.16 MMOL/L — SIGNIFICANT CHANGE UP (ref 1.12–1.3)
ISTAT VENOUS PCO2: 52 MMHG — HIGH (ref 41–51)
ISTAT VENOUS PH: 7.36 — SIGNIFICANT CHANGE UP (ref 7.31–7.41)
ISTAT VENOUS PO2: <66 MMHG — LOW (ref 35–40)
ISTAT VENOUS POTASSIUM: 4.4 MMOL/L — SIGNIFICANT CHANGE UP (ref 3.5–5.3)
ISTAT VENOUS SO2: 52 % — SIGNIFICANT CHANGE UP
ISTAT VENOUS SODIUM: 142 MMOL/L — SIGNIFICANT CHANGE UP (ref 135–145)
ISTAT VENOUS TCO2: 31 MMOL/L — SIGNIFICANT CHANGE UP (ref 22–31)
POCT ISTAT CREATININE: 4.1 MG/DL — HIGH (ref 0.5–1.3)

## 2024-08-15 PROCEDURE — 85014 HEMATOCRIT: CPT

## 2024-08-15 PROCEDURE — 85610 PROTHROMBIN TIME: CPT

## 2024-08-15 PROCEDURE — 82803 BLOOD GASES ANY COMBINATION: CPT

## 2024-08-15 PROCEDURE — 82330 ASSAY OF CALCIUM: CPT

## 2024-08-15 PROCEDURE — 33263 RMVL & RPLCMT DFB GEN 2 LEAD: CPT

## 2024-08-15 PROCEDURE — 82947 ASSAY GLUCOSE BLOOD QUANT: CPT

## 2024-08-15 PROCEDURE — C1889: CPT

## 2024-08-15 PROCEDURE — 84132 ASSAY OF SERUM POTASSIUM: CPT

## 2024-08-15 PROCEDURE — 84295 ASSAY OF SERUM SODIUM: CPT

## 2024-08-15 PROCEDURE — C1721: CPT

## 2024-08-15 PROCEDURE — 82565 ASSAY OF CREATININE: CPT

## 2024-08-15 PROCEDURE — C1898: CPT

## 2024-08-15 RX ORDER — CEFAZOLIN SODIUM 10 G
2000 VIAL (EA) INJECTION ONCE
Refills: 0 | Status: DISCONTINUED | OUTPATIENT
Start: 2024-08-15 | End: 2024-08-15

## 2024-08-15 NOTE — PROGRESS NOTE ADULT - ASSESSMENT
87 yo male with a Hx of AF on AC, HOCM s/p septal alcohol ablation and D/C ICD ([employer]), HTN, HLD, CKD4 coming in for an elective pulse generator change.    -Post Procedural ECG  -Resume home medications  -Monitor for hematoma  -Follow up in the outpatient setting with Dr. Fraser

## 2024-08-15 NOTE — PROGRESS NOTE ADULT - SUBJECTIVE AND OBJECTIVE BOX
Brief Electrophysiology Procedure Note:  89 yo male with a Hx of AF on AC, HOCM s/p septal alcohol ablation and D/C ICD ([employer]), HTN, HLD, CKD4 coming in for an elective pulse generator change.    Brief Procedural Details:  Administration of local anesthetic just inferior to the previous incision site. a 5-7 cm incision was made inferior to the clavicle. Blunt and electro-cautery dissection was made until visualization of the pulse generator. The pulse generator was removed, and the leads were subsequently inspected. Further dissection of the fibrous tissue was made to free up the leads. New pulse generator was brought into the field. The leads were attached to the new pulse generator and subsequently interrogated showing adequate sensitivities and thresholds. The pocket was irrigated, and the new pulse generator was placed in a TYRX antimicrobial pouch. The pocket was subsequently closed with 2-0 and 3-0 Vicryl, subcuticular layer was closed with 4-0 Vicryl, and the skin was closed with Dermabond.

## 2024-08-15 NOTE — PACU DISCHARGE NOTE - COMMENTS
There was an anesthesiologist present who visibly evaluated this patient and deemed the patient ready for discharge to home. Patient met discharge and Daniel criteria. Daniel score greater or equal to 9.

## 2024-12-02 ENCOUNTER — RX RENEWAL (OUTPATIENT)
Age: 89
End: 2024-12-02

## 2024-12-17 ENCOUNTER — NON-APPOINTMENT (OUTPATIENT)
Age: 89
End: 2024-12-17

## 2024-12-17 ENCOUNTER — APPOINTMENT (OUTPATIENT)
Dept: NEPHROLOGY | Facility: CLINIC | Age: 89
End: 2024-12-17
Payer: MEDICARE

## 2024-12-17 VITALS
DIASTOLIC BLOOD PRESSURE: 78 MMHG | BODY MASS INDEX: 26.2 KG/M2 | HEIGHT: 70 IN | WEIGHT: 183 LBS | SYSTOLIC BLOOD PRESSURE: 165 MMHG | OXYGEN SATURATION: 97 % | TEMPERATURE: 97.6 F | HEART RATE: 61 BPM

## 2024-12-17 DIAGNOSIS — I13.0 HYPERTENSIVE HEART AND CHRONIC KIDNEY DISEASE WITH HEART FAILURE AND STAGE 1 THROUGH STAGE 4 CHRONIC KIDNEY DISEASE, OR UNSPECIFIED CHRONIC KIDNEY DISEASE: ICD-10-CM

## 2024-12-17 DIAGNOSIS — N18.4 CHRONIC KIDNEY DISEASE, STAGE 4 (SEVERE): ICD-10-CM

## 2024-12-17 DIAGNOSIS — I10 ESSENTIAL (PRIMARY) HYPERTENSION: ICD-10-CM

## 2024-12-17 PROCEDURE — G2211 COMPLEX E/M VISIT ADD ON: CPT

## 2024-12-17 PROCEDURE — 99214 OFFICE O/P EST MOD 30 MIN: CPT

## 2024-12-19 LAB
25(OH)D3 SERPL-MCNC: 39.5 NG/ML
ALBUMIN SERPL ELPH-MCNC: 4.1 G/DL
ALP BLD-CCNC: 60 U/L
ALT SERPL-CCNC: 11 U/L
ANION GAP SERPL CALC-SCNC: 14 MMOL/L
APPEARANCE: CLEAR
AST SERPL-CCNC: 13 U/L
BACTERIA: NEGATIVE /HPF
BASOPHILS # BLD AUTO: 0.09 K/UL
BASOPHILS NFR BLD AUTO: 0.9 %
BILIRUB SERPL-MCNC: 0.5 MG/DL
BILIRUBIN URINE: NEGATIVE
BLOOD URINE: NEGATIVE
BUN SERPL-MCNC: 45 MG/DL
CALCIUM SERPL-MCNC: 8.4 MG/DL
CALCIUM SERPL-MCNC: 8.4 MG/DL
CAST: 0 /LPF
CHLORIDE SERPL-SCNC: 109 MMOL/L
CHOLEST SERPL-MCNC: 192 MG/DL
CO2 SERPL-SCNC: 21 MMOL/L
COLOR: YELLOW
CREAT SERPL-MCNC: 3.36 MG/DL
CREAT SPEC-SCNC: 111 MG/DL
EGFR: 17 ML/MIN/1.73M2
EOSINOPHIL # BLD AUTO: 0.96 K/UL
EOSINOPHIL NFR BLD AUTO: 9.7 %
EPITHELIAL CELLS: 0 /HPF
ESTIMATED AVERAGE GLUCOSE: 108 MG/DL
GLUCOSE QUALITATIVE U: NEGATIVE MG/DL
GLUCOSE SERPL-MCNC: 90 MG/DL
HBA1C MFR BLD HPLC: 5.4 %
HCT VFR BLD CALC: 42.8 %
HDLC SERPL-MCNC: 44 MG/DL
HGB BLD-MCNC: 13.3 G/DL
IMM GRANULOCYTES NFR BLD AUTO: 0.3 %
KETONES URINE: NEGATIVE MG/DL
LDLC SERPL CALC-MCNC: 134 MG/DL
LEUKOCYTE ESTERASE URINE: NEGATIVE
LYMPHOCYTES # BLD AUTO: 1.88 K/UL
LYMPHOCYTES NFR BLD AUTO: 18.9 %
MAGNESIUM SERPL-MCNC: 2.6 MG/DL
MAN DIFF?: NORMAL
MCHC RBC-ENTMCNC: 28.5 PG
MCHC RBC-ENTMCNC: 31.1 G/DL
MCV RBC AUTO: 91.6 FL
MICROALBUMIN 24H UR DL<=1MG/L-MCNC: 2.3 MG/DL
MICROALBUMIN/CREAT 24H UR-RTO: 21 MG/G
MICROSCOPIC-UA: NORMAL
MONOCYTES # BLD AUTO: 0.83 K/UL
MONOCYTES NFR BLD AUTO: 8.4 %
NEUTROPHILS # BLD AUTO: 6.14 K/UL
NEUTROPHILS NFR BLD AUTO: 61.8 %
NITRITE URINE: NEGATIVE
NONHDLC SERPL-MCNC: 147 MG/DL
PARATHYROID HORMONE INTACT: 442 PG/ML
PH URINE: 6.5
PHOSPHATE SERPL-MCNC: 2.6 MG/DL
PLATELET # BLD AUTO: 185 K/UL
POTASSIUM SERPL-SCNC: 4.7 MMOL/L
PROT SERPL-MCNC: 7 G/DL
PROTEIN URINE: NORMAL MG/DL
RBC # BLD: 4.67 M/UL
RBC # FLD: 17.7 %
RED BLOOD CELLS URINE: 0 /HPF
SODIUM SERPL-SCNC: 144 MMOL/L
SPECIFIC GRAVITY URINE: 1.01
TRIGL SERPL-MCNC: 73 MG/DL
URATE SERPL-MCNC: 7.1 MG/DL
UROBILINOGEN URINE: 0.2 MG/DL
WBC # FLD AUTO: 9.93 K/UL
WHITE BLOOD CELLS URINE: 0 /HPF

## 2025-04-10 ENCOUNTER — APPOINTMENT (OUTPATIENT)
Dept: NEPHROLOGY | Facility: CLINIC | Age: 89
End: 2025-04-10
Payer: MEDICARE

## 2025-04-10 VITALS
WEIGHT: 180 LBS | SYSTOLIC BLOOD PRESSURE: 123 MMHG | HEART RATE: 60 BPM | OXYGEN SATURATION: 99 % | BODY MASS INDEX: 25.77 KG/M2 | HEIGHT: 70 IN | DIASTOLIC BLOOD PRESSURE: 69 MMHG | TEMPERATURE: 97.9 F

## 2025-04-10 DIAGNOSIS — I13.0 HYPERTENSIVE HEART AND CHRONIC KIDNEY DISEASE WITH HEART FAILURE AND STAGE 1 THROUGH STAGE 4 CHRONIC KIDNEY DISEASE, OR UNSPECIFIED CHRONIC KIDNEY DISEASE: ICD-10-CM

## 2025-04-10 DIAGNOSIS — N18.4 CHRONIC KIDNEY DISEASE, STAGE 4 (SEVERE): ICD-10-CM

## 2025-04-10 DIAGNOSIS — I10 ESSENTIAL (PRIMARY) HYPERTENSION: ICD-10-CM

## 2025-04-10 PROCEDURE — 99214 OFFICE O/P EST MOD 30 MIN: CPT

## 2025-04-10 PROCEDURE — G2211 COMPLEX E/M VISIT ADD ON: CPT

## 2025-04-10 RX ORDER — PANTOPRAZOLE 40 MG/1
40 TABLET, DELAYED RELEASE ORAL
Refills: 0 | Status: ACTIVE | COMMUNITY

## 2025-04-11 DIAGNOSIS — E53.8 DEFICIENCY OF OTHER SPECIFIED B GROUP VITAMINS: ICD-10-CM

## 2025-04-11 LAB
25(OH)D3 SERPL-MCNC: 36.6 NG/ML
ALBUMIN SERPL ELPH-MCNC: 4.2 G/DL
ALP BLD-CCNC: 69 U/L
ALT SERPL-CCNC: 15 U/L
ANION GAP SERPL CALC-SCNC: 15 MMOL/L
APPEARANCE: CLEAR
AST SERPL-CCNC: 17 U/L
BACTERIA: NEGATIVE /HPF
BASOPHILS # BLD AUTO: 0.05 K/UL
BASOPHILS NFR BLD AUTO: 0.7 %
BILIRUB SERPL-MCNC: 0.5 MG/DL
BILIRUBIN URINE: NEGATIVE
BLOOD URINE: NEGATIVE
BUN SERPL-MCNC: 62 MG/DL
CALCIUM SERPL-MCNC: 9.9 MG/DL
CALCIUM SERPL-MCNC: 9.9 MG/DL
CAST: 2 /LPF
CHLORIDE SERPL-SCNC: 103 MMOL/L
CHOLEST SERPL-MCNC: 170 MG/DL
CO2 SERPL-SCNC: 26 MMOL/L
COLOR: YELLOW
CREAT SERPL-MCNC: 4.66 MG/DL
CREAT SPEC-SCNC: 100 MG/DL
EGFRCR SERPLBLD CKD-EPI 2021: 11 ML/MIN/1.73M2
EOSINOPHIL # BLD AUTO: 0.12 K/UL
EOSINOPHIL NFR BLD AUTO: 1.6 %
EPITHELIAL CELLS: 0 /HPF
ESTIMATED AVERAGE GLUCOSE: 120 MG/DL
FERRITIN SERPL-MCNC: 253 NG/ML
FOLATE SERPL-MCNC: 2.6 NG/ML
GLUCOSE QUALITATIVE U: NEGATIVE MG/DL
GLUCOSE SERPL-MCNC: 84 MG/DL
HBA1C MFR BLD HPLC: 5.8 %
HCT VFR BLD CALC: 41.3 %
HDLC SERPL-MCNC: 39 MG/DL
HGB BLD-MCNC: 12.6 G/DL
IMM GRANULOCYTES NFR BLD AUTO: 0.5 %
IRON SATN MFR SERPL: 25 %
IRON SERPL-MCNC: 61 UG/DL
KETONES URINE: NEGATIVE MG/DL
LDLC SERPL-MCNC: 121 MG/DL
LEUKOCYTE ESTERASE URINE: NEGATIVE
LYMPHOCYTES # BLD AUTO: 1.48 K/UL
LYMPHOCYTES NFR BLD AUTO: 20.3 %
MAGNESIUM SERPL-MCNC: 2.6 MG/DL
MAN DIFF?: NORMAL
MCHC RBC-ENTMCNC: 28.1 PG
MCHC RBC-ENTMCNC: 30.5 G/DL
MCV RBC AUTO: 92 FL
MICROALBUMIN 24H UR DL<=1MG/L-MCNC: 1.5 MG/DL
MICROALBUMIN/CREAT 24H UR-RTO: 15 MG/G
MICROSCOPIC-UA: NORMAL
MONOCYTES # BLD AUTO: 0.8 K/UL
MONOCYTES NFR BLD AUTO: 11 %
NEUTROPHILS # BLD AUTO: 4.8 K/UL
NEUTROPHILS NFR BLD AUTO: 65.9 %
NITRITE URINE: NEGATIVE
NONHDLC SERPL-MCNC: 131 MG/DL
PARATHYROID HORMONE INTACT: 133 PG/ML
PH URINE: 6.5
PHOSPHATE SERPL-MCNC: 3.4 MG/DL
PLATELET # BLD AUTO: 177 K/UL
POTASSIUM SERPL-SCNC: 5.4 MMOL/L
PROT SERPL-MCNC: 7 G/DL
PROTEIN URINE: NORMAL MG/DL
RBC # BLD: 4.49 M/UL
RBC # FLD: 16.5 %
RED BLOOD CELLS URINE: 0 /HPF
SODIUM SERPL-SCNC: 144 MMOL/L
SPECIFIC GRAVITY URINE: 1.01
TIBC SERPL-MCNC: 249 UG/DL
TRIGL SERPL-MCNC: 49 MG/DL
UIBC SERPL-MCNC: 188 UG/DL
URATE SERPL-MCNC: 8.8 MG/DL
UROBILINOGEN URINE: 0.2 MG/DL
VIT B12 SERPL-MCNC: 930 PG/ML
WBC # FLD AUTO: 7.29 K/UL
WHITE BLOOD CELLS URINE: 0 /HPF

## 2025-04-11 RX ORDER — FOLIC ACID 1 MG/1
1 TABLET ORAL DAILY
Qty: 90 | Refills: 3 | Status: ACTIVE | COMMUNITY
Start: 2025-04-11 | End: 1900-01-01

## 2025-06-11 NOTE — ED ADULT NURSE NOTE - NS TRANSFER PATIENT BELONGINGS
Health Maintenance       Pneumococcal Vaccine 50+ (2 of 2 - PCV)  Overdue since 5/28/2022    COVID-19 Vaccine (4 - 2024-25 season)  Overdue since 9/1/2024    Hepatitis A Vaccine (1 of 2 - Risk 2-dose series)  Never done    Shingles Vaccine (1 of 2)  Never done    Hepatitis B Vaccine (1 of 3 - 19+ 3-dose series)  Never done    Colorectal Cancer Screening  Never done           Following review of the above:  Pended orders    Note: Refer to final orders and clinician documentation.       Clothing

## 2025-09-09 ENCOUNTER — APPOINTMENT (OUTPATIENT)
Dept: NEPHROLOGY | Facility: CLINIC | Age: 89
End: 2025-09-09
Payer: MEDICARE

## 2025-09-09 VITALS
SYSTOLIC BLOOD PRESSURE: 136 MMHG | WEIGHT: 178 LBS | DIASTOLIC BLOOD PRESSURE: 79 MMHG | HEIGHT: 70 IN | OXYGEN SATURATION: 99 % | TEMPERATURE: 98 F | HEART RATE: 66 BPM | BODY MASS INDEX: 25.48 KG/M2

## 2025-09-09 DIAGNOSIS — N18.4 CHRONIC KIDNEY DISEASE, STAGE 4 (SEVERE): ICD-10-CM

## 2025-09-09 DIAGNOSIS — I13.0 HYPERTENSIVE HEART AND CHRONIC KIDNEY DISEASE WITH HEART FAILURE AND STAGE 1 THROUGH STAGE 4 CHRONIC KIDNEY DISEASE, OR UNSPECIFIED CHRONIC KIDNEY DISEASE: ICD-10-CM

## 2025-09-09 DIAGNOSIS — I10 ESSENTIAL (PRIMARY) HYPERTENSION: ICD-10-CM

## 2025-09-09 PROCEDURE — 99214 OFFICE O/P EST MOD 30 MIN: CPT

## 2025-09-09 PROCEDURE — G2211 COMPLEX E/M VISIT ADD ON: CPT

## 2025-09-10 LAB
ALBUMIN SERPL ELPH-MCNC: 4.2 G/DL
ANION GAP SERPL CALC-SCNC: 25 MMOL/L
BASOPHILS # BLD AUTO: 0.06 K/UL
BASOPHILS NFR BLD AUTO: 0.9 %
BUN SERPL-MCNC: 76 MG/DL
CALCIUM SERPL-MCNC: 9.9 MG/DL
CHLORIDE SERPL-SCNC: 104 MMOL/L
CO2 SERPL-SCNC: 14 MMOL/L
CREAT SERPL-MCNC: 3.76 MG/DL
EGFRCR SERPLBLD CKD-EPI 2021: 15 ML/MIN/1.73M2
EOSINOPHIL # BLD AUTO: 0.13 K/UL
EOSINOPHIL NFR BLD AUTO: 1.9 %
GLUCOSE SERPL-MCNC: 51 MG/DL
HCT VFR BLD CALC: 42.7 %
HGB BLD-MCNC: 12.6 G/DL
IMM GRANULOCYTES NFR BLD AUTO: 0.1 %
LYMPHOCYTES # BLD AUTO: 1.48 K/UL
LYMPHOCYTES NFR BLD AUTO: 21.4 %
MAGNESIUM SERPL-MCNC: 3 MG/DL
MAN DIFF?: NORMAL
MCHC RBC-ENTMCNC: 28.5 PG
MCHC RBC-ENTMCNC: 29.5 G/DL
MCV RBC AUTO: 96.6 FL
MONOCYTES # BLD AUTO: 0.68 K/UL
MONOCYTES NFR BLD AUTO: 9.8 %
NEUTROPHILS # BLD AUTO: 4.55 K/UL
NEUTROPHILS NFR BLD AUTO: 65.9 %
PHOSPHATE SERPL-MCNC: 3.6 MG/DL
PLATELET # BLD AUTO: 153 K/UL
POTASSIUM SERPL-SCNC: 6.8 MMOL/L
RBC # BLD: 4.42 M/UL
RBC # FLD: 17.4 %
SODIUM SERPL-SCNC: 142 MMOL/L
URATE SERPL-MCNC: 7.1 MG/DL
WBC # FLD AUTO: 6.91 K/UL

## 2025-09-11 LAB
ALBUMIN SERPL ELPH-MCNC: 4.4 G/DL
ANION GAP SERPL CALC-SCNC: 15 MMOL/L
BUN SERPL-MCNC: 80 MG/DL
CALCIUM SERPL-MCNC: 9.4 MG/DL
CHLORIDE SERPL-SCNC: 106 MMOL/L
CO2 SERPL-SCNC: 23 MMOL/L
CREAT SERPL-MCNC: 3.78 MG/DL
EGFRCR SERPLBLD CKD-EPI 2021: 15 ML/MIN/1.73M2
GLUCOSE SERPL-MCNC: 119 MG/DL
PHOSPHATE SERPL-MCNC: 4.5 MG/DL
POTASSIUM SERPL-SCNC: 4.6 MMOL/L
SODIUM SERPL-SCNC: 144 MMOL/L